# Patient Record
Sex: MALE | Race: WHITE | Employment: OTHER | URBAN - METROPOLITAN AREA
[De-identification: names, ages, dates, MRNs, and addresses within clinical notes are randomized per-mention and may not be internally consistent; named-entity substitution may affect disease eponyms.]

---

## 2018-11-07 ENCOUNTER — HOSPITAL ENCOUNTER (OUTPATIENT)
Age: 78
Discharge: HOME | End: 2018-11-09
Payer: COMMERCIAL

## 2018-11-07 DIAGNOSIS — E78.01 FAMILIAL HYPERCHOLESTEROLEMIA: ICD-10-CM

## 2018-11-07 DIAGNOSIS — R73.9 HYPERGLYCEMIA: ICD-10-CM

## 2018-11-07 DIAGNOSIS — E03.9 PRIMARY HYPOTHYROIDISM: ICD-10-CM

## 2018-11-07 DIAGNOSIS — E55.9 VITAMIN D DEFICIENCY: ICD-10-CM

## 2018-11-07 LAB
ALBUMIN SERPL-MCNC: 4.3 G/DL (ref 3.5–5.2)
ALP BLD-CCNC: 28 U/L (ref 40–129)
ALT SERPL-CCNC: 17 U/L (ref 0–40)
ANION GAP SERPL CALCULATED.3IONS-SCNC: 17 MMOL/L (ref 7–16)
AST SERPL-CCNC: 18 U/L (ref 0–39)
BILIRUB SERPL-MCNC: 0.5 MG/DL (ref 0–1.2)
BUN BLDV-MCNC: 10 MG/DL (ref 8–23)
CALCIUM SERPL-MCNC: 8.7 MG/DL (ref 8.6–10.2)
CHLORIDE BLD-SCNC: 103 MMOL/L (ref 98–107)
CHOLESTEROL, TOTAL: 116 MG/DL (ref 0–199)
CO2: 22 MMOL/L (ref 22–29)
CREAT SERPL-MCNC: 0.9 MG/DL (ref 0.7–1.2)
GFR AFRICAN AMERICAN: >60
GFR NON-AFRICAN AMERICAN: >60 ML/MIN/1.73
GLUCOSE BLD-MCNC: 132 MG/DL (ref 74–99)
HBA1C MFR BLD: 7.1 % (ref 4–5.6)
HDLC SERPL-MCNC: 40 MG/DL
LDL CHOLESTEROL CALCULATED: 47 MG/DL (ref 0–99)
POTASSIUM SERPL-SCNC: 4.6 MMOL/L (ref 3.5–5)
SODIUM BLD-SCNC: 142 MMOL/L (ref 132–146)
TOTAL PROTEIN: 6.8 G/DL (ref 6.4–8.3)
TRIGL SERPL-MCNC: 144 MG/DL (ref 0–149)
TSH SERPL DL<=0.05 MIU/L-ACNC: 4.9 UIU/ML (ref 0.27–4.2)
VITAMIN D 25-HYDROXY: 31 NG/ML (ref 30–100)
VLDLC SERPL CALC-MCNC: 29 MG/DL

## 2018-11-07 PROCEDURE — 80061 LIPID PANEL: CPT

## 2018-11-07 PROCEDURE — 80053 COMPREHEN METABOLIC PANEL: CPT

## 2018-11-07 PROCEDURE — 82306 VITAMIN D 25 HYDROXY: CPT

## 2018-11-07 PROCEDURE — 83036 HEMOGLOBIN GLYCOSYLATED A1C: CPT

## 2018-11-07 PROCEDURE — 84443 ASSAY THYROID STIM HORMONE: CPT

## 2019-02-26 ENCOUNTER — HOSPITAL ENCOUNTER (OUTPATIENT)
Age: 79
Discharge: HOME | End: 2019-02-28
Payer: MEDICARE

## 2019-02-26 DIAGNOSIS — E11.9 TYPE 2 DIABETES MELLITUS WITHOUT COMPLICATION, WITHOUT LONG-TERM CURRENT USE OF INSULIN (HCC): ICD-10-CM

## 2019-02-26 DIAGNOSIS — E03.9 PRIMARY HYPOTHYROIDISM: ICD-10-CM

## 2019-02-26 LAB
ANION GAP SERPL CALCULATED.3IONS-SCNC: 15 MMOL/L (ref 7–16)
BUN BLDV-MCNC: 11 MG/DL (ref 8–23)
CALCIUM SERPL-MCNC: 9.3 MG/DL (ref 8.6–10.2)
CHLORIDE BLD-SCNC: 100 MMOL/L (ref 98–107)
CO2: 25 MMOL/L (ref 22–29)
CREAT SERPL-MCNC: 1 MG/DL (ref 0.7–1.2)
GFR AFRICAN AMERICAN: >60
GFR NON-AFRICAN AMERICAN: >60 ML/MIN/1.73
GLUCOSE BLD-MCNC: 95 MG/DL (ref 74–99)
HBA1C MFR BLD: 7.8 % (ref 4–5.6)
POTASSIUM SERPL-SCNC: 4.6 MMOL/L (ref 3.5–5)
SODIUM BLD-SCNC: 140 MMOL/L (ref 132–146)
T4 FREE: 1.3 NG/DL (ref 0.93–1.7)

## 2019-02-26 PROCEDURE — 80048 BASIC METABOLIC PNL TOTAL CA: CPT

## 2019-02-26 PROCEDURE — 83036 HEMOGLOBIN GLYCOSYLATED A1C: CPT

## 2019-02-26 PROCEDURE — 84439 ASSAY OF FREE THYROXINE: CPT

## 2019-04-06 ENCOUNTER — HOSPITAL ENCOUNTER (OUTPATIENT)
Age: 79
Discharge: HOME | End: 2019-04-08
Payer: MEDICARE

## 2019-04-06 DIAGNOSIS — Z12.5 SPECIAL SCREENING FOR MALIGNANT NEOPLASM OF PROSTATE: ICD-10-CM

## 2019-04-06 DIAGNOSIS — E11.9 TYPE 2 DIABETES MELLITUS WITHOUT COMPLICATION, WITHOUT LONG-TERM CURRENT USE OF INSULIN (HCC): ICD-10-CM

## 2019-04-06 LAB
ANION GAP SERPL CALCULATED.3IONS-SCNC: 14 MMOL/L (ref 7–16)
BUN BLDV-MCNC: 13 MG/DL (ref 8–23)
CALCIUM SERPL-MCNC: 9.2 MG/DL (ref 8.6–10.2)
CHLORIDE BLD-SCNC: 107 MMOL/L (ref 98–107)
CO2: 22 MMOL/L (ref 22–29)
CREAT SERPL-MCNC: 0.9 MG/DL (ref 0.7–1.2)
GFR AFRICAN AMERICAN: >60
GFR NON-AFRICAN AMERICAN: >60 ML/MIN/1.73
GLUCOSE BLD-MCNC: 104 MG/DL (ref 74–99)
POTASSIUM SERPL-SCNC: 4.2 MMOL/L (ref 3.5–5)
PROSTATE SPECIFIC ANTIGEN: 1.87 NG/ML (ref 0–4)
SODIUM BLD-SCNC: 143 MMOL/L (ref 132–146)

## 2019-04-06 PROCEDURE — G0103 PSA SCREENING: HCPCS

## 2019-04-06 PROCEDURE — 36415 COLL VENOUS BLD VENIPUNCTURE: CPT

## 2019-04-06 PROCEDURE — 80048 BASIC METABOLIC PNL TOTAL CA: CPT

## 2019-04-08 ENCOUNTER — HOSPITAL ENCOUNTER (OUTPATIENT)
Dept: DIABETES SERVICES | Age: 79
Setting detail: THERAPIES SERIES
Discharge: HOME | End: 2019-04-08
Payer: MEDICARE

## 2019-04-08 PROCEDURE — G0109 DIAB MANAGE TRN IND/GROUP: HCPCS | Performed by: DIETITIAN, REGISTERED

## 2019-04-08 SDOH — ECONOMIC STABILITY: FOOD INSECURITY: ADDITIONAL INFORMATION: NO

## 2019-04-09 ENCOUNTER — HOSPITAL ENCOUNTER (OUTPATIENT)
Dept: DIABETES SERVICES | Age: 79
Setting detail: THERAPIES SERIES
Discharge: HOME | End: 2019-04-09
Payer: MEDICARE

## 2019-04-09 PROCEDURE — G0109 DIAB MANAGE TRN IND/GROUP: HCPCS | Performed by: DIETITIAN, REGISTERED

## 2019-04-09 NOTE — PROGRESS NOTES
Diabetes Self-Management Education Record    Participant Name: Neto Wright  Referring Provider: Kimi Calle DO  Assessment/Evaluation Ratings:  1=Needs Instruction   4=Demonstrates Understanding/Competency  2=Needs Review   NC=Not Covered    3=Comprehends Key Points  N/A=Not Applicable  Topics/Learning Objectives Pre-session Assess Date:  4/8/19 PC Instr. Date Reinforce Date Post- session Eval Comments   Diabetes disease process & Treatment process: Define diabetes & pre-diabetes; Identify own type of diabetes; role of the pancreas; signs/symptoms; diagnostic criteria; prevention & treatment options; contributing factors. 1 4/8/19 PC  3 New Onset Type 2 DM       Incorporating nutritional management into lifestyle: Describe effect of type, amount & timing of food on blood glucose; Describe basic meal planning techniques & current nutrition guidelines;Correctly read food labels & demonstrate CHO counting & portion control with personalized meal plan. Identify dining out strategies, & dietary sick day guidelines. 1       Incorporating physical activity into lifestyle:   Verbalize effect of exercise on blood glucose levels; benefits of regular exercise; safety considerations; contraindications; maintenance of activity. 1 4/8/19 PC  3 Aerobics, mowing/yard work walking     As needed 10-20 minutes a session   Using medications safely:  Identify effects of diabetes medicines on blood glucose levels; List diabetes medication taken, action & side effects; appropriate injection sites; proper storage; supplies needed; proper technique; safe needle disposal guidelines. 1 4/8/19 PC  4/9/19 PC  2 No DM meds at this time   Monitoring blood glucose, interpreting and using results:  Identify recommended & personal blood glucose targets; importance of testing; testing supplies; HgbA1C target levels; Factors affecting blood glucose;  Importance of logging blood glucose levels for pattern recognition; ketone testing; safe lancet disposal. 1 4/8/19 PC  3 Testing daily   Prevention, detection & treatment of acute complications:  Identify symptoms of hyper & hypoglycemia, and prevention & treatment strategies. Describe sick day guidelines & indications for ketone testing & physician notification. Identify short term consequences of poor control. 1 4/8/19 PC  3 A1C 7.8%       Prevention, detection & treatment of chronic complications:  Define the natural course of diabetes & describe the relationship of blood glucose levels to long term complications of diabetes. Identify preventative measures & standards of care. 1 4/8/19 PC  3 Heart disease    High lipids   Developing strategies to address psychosocial issues:  Describe feelings about living with diabetes; Describe how stress, depression & anxiety affect blood glucose; Identify coping strategies; Identify support needed & support network available. 1 4/8/19 PC  4/9/19 PC  3 PHQ-9 Depression Screen Score: 0     Developing strategies to promote health/change behavior: Identify 7 self-care behaviors; Personal health risk factors; Benefits, challenges & strategies for behavioral change; Individualized goal selection.  1    Goal:     Identified Barriers to learning/adherence to self management plan:    None    Instruction Method:  Lecture/Discussion, Power Point Presentation  and Handouts    Education Materials/Equipment Provided: Self-management manual, Meal Plan and Nutritional Packet       Encounter Type Date Start Time End Time Comments No Show Dates   Assessment 4/8/19  0900 0930   In person    Session 1 4/8/19  0930 1200      Session 2 4/9/19  0900 0930     Session 3         Individual MNT         Gestational MNT         Shared Med Appt         Session 4        Meter Instrx        Insulin Instrx            Additional Comments:    DSMS Support Plan:  Follow-up plan/Date: 7/2019  Contact Post Class Regarding:   Fasting Blood Sugar   HgbA1C   Weight   Hypertension/Follow-up with Physician   Self-Foot Exam Frequency   Monitoring Frequency   Exercise Routine   Goal Attainment  Post Education Referrals:      ? WIC   ? PAP   ? Wound Care   ? Social Service   ?  Home Medical    ?Physician Specialist   ?[address]   ? Sleep Lab   ? Other

## 2019-04-09 NOTE — PROGRESS NOTES
4/8/19 PC 0930 1200      Session 2 4/9/19 PC  4/9/19 REM 0900  0930 0930  1130     Session 3         Individual MNT         Gestational MNT         Shared Med Appt         Session 4        Meter Instrx        Insulin Instrx            Additional Comments:    DSMS Support Plan:  Follow-up plan/Date: 7/2019  Contact Post Class Regarding:   Fasting Blood Sugar   HgbA1C   Weight   Hypertension/Follow-up with Physician   Self-Foot Exam Frequency   Monitoring Frequency   Exercise Routine   Goal Attainment  Post Education Referrals:      ? WIC   ? PAP   ? Wound Care   ? Social Service   ?  Home Medical    ?Physician Specialist   ?[address]   ? Sleep Lab   ? Other

## 2019-04-09 NOTE — LETTER
800  - Diabetes Education    2019       Re:     Massiel Ariza         :  1940  Dear Dr. Alma Duncan:                    Thank you for referring your patient, Massiel Ariza, for diabetes education sessions.     Your patient attended classes the week of 19, that addressed the following topics:    Nursing/Medical [x]     Nutrition [x]  · Describing the diabetes disease process/ treatment options  · Incorporating physical activity into lifestyle  · Using medication safely & for maximum therapeutic effectiveness  · Monitoring blood glucose & other parameters:  Interpreting and using results for self-management & decision making  · Preventing, detecting, & treating acute complications  · Preventing, detecting & treating chronic complications  · Developing personal strategies to address psychosocial issues and concerns  · Developing personal strategies to promote health & behavior change (risk reduction) · Incorporating nutrition management into lifestyle    · Nutrition for diabetes prevention & diabetes  · Relationship among nutrition, exercise, medication & blood glucose levels  · Food Groups/carbohydrate & non- carbohydrate foods  · Whole grain/high fiber foods & needs  · Fluid needs   · Label reading  · Carbohydrate consistent meal planning/ techniques  · Weighing/measuring portions  · Heart healthy guidelines: fat, sodium & cholesterol  · Alcohol  · Free foods/nutritive and non-nutritive sweeteners  · Dining out tips and recipe modification guidelines  · Sick day guidelines     [x]  Instructed on carbohydrate consistent meal plan with  2000 calories, and the following number of carbohydrate servings per meal or snack (15 gm/serving):   Breakfast:  4,  Lunch: 5, Dinner:  4     AM Snack:  0,  PM Snack:  0,  HS Snack:  2    Upon completion of these sessions the diabetes team made the following evaluation of your patients progress:[] Attended class alone [x] Attended classes accompanied by                family/friend/significant other  [x] Very attentive to teaching  [x] Actively participated in class                      discussions   [x] Answered questions appropriately            when asked   [x] Seems able to apply class concepts to     daily lifestyle  [] Had difficulty relating class information      to daily lifestyle  [x] Seems motivated to do well [x] Participated in [place] nutrition                  session  [x] Able to identify proper food choices      and diet changes  [] Unable to identify proper food choices  [x] Verbalizes an understanding of meal       plan  [x] Expresses an intent to comply with                 meal plan  [] Refused to participate in  [place]                nutrition session  [x] Worked out meal timing adjustment                 according to work/schedule/lifestyle     PHQ-9 Depression Screen Score:  0  0-4: Minimal Depression              5-9: Mild Depression    10-14: Moderate Depression  15-19: Moderately Severe Depression  20-27: Severe Depression     COMMENTS:      PATIENT SELECTED GOAL: Check feet more often, get eyes examined. DIABETES SELF-MANAGEMENT SUPPORT PLAN/REFERRALS (patient identified):  [] Contact prescription assistance program  [] Attend smoking cessation classes  [] Diabetes Support Group  [] Attend a follow-up support session  [] Diabetes [place] Subscription   [] Follow up with doctor regarding depression  [] Join an exercise program  [x] Talk with family and friends for support  [] See a financial counselor   [x] Call Diabetes Educator with questions  [] Podiatrist appointment   [x] Ophthalmologist  [] Dental appointment   [] Use Helpful Program Resource             Guide  [] Websites:                                                             www.[employer]ISBX                   www.diabetes. org                    www.eatright. org                   www.[employer]. [place] There will be a follow-up in 3 months to evaluate A1C, carbohydrate recall, attainment of their chosen goal, and self identified support plan. Thank you for referring this patient to our program.  Please do not hesitate to call if you have any questions at [phone] [facility] or [facility]) or [phone] ([address] [place] [address]).         Sincerely,    Diabetes Educators:     [x]  Nadine Mccann RN CDE      []  Joon Jones RN BSN CDE     []  Beverley Everett RDN LD CDE          []  Oskar Schmid RDN LD CDE  [x]  DARRYL [place]brook   []  Dex Vila, MS RDN  LD           Diabetes

## 2019-04-10 ENCOUNTER — HOSPITAL ENCOUNTER (OUTPATIENT)
Dept: DIABETES SERVICES | Age: 79
Setting detail: THERAPIES SERIES
Discharge: HOME | End: 2019-04-10
Payer: MEDICARE

## 2019-04-10 PROCEDURE — 97804 MEDICAL NUTRITION GROUP: CPT

## 2019-04-10 NOTE — PROGRESS NOTES
exercise; safety considerations; contraindications; maintenance of activity. 1 4/8/19 PC  3 Aerobics, mowing/yard work walking     As needed 10-20 minutes a session   Using medications safely:  Identify effects of diabetes medicines on blood glucose levels; List diabetes medication taken, action & side effects; appropriate injection sites; proper storage; supplies needed; proper technique; safe needle disposal guidelines. 1 4/8/19 PC  4/9/19 PC  2 No DM meds at this time   Monitoring blood glucose, interpreting and using results:  Identify recommended & personal blood glucose targets; importance of testing; testing supplies; HgbA1C target levels; Factors affecting blood glucose; Importance of logging blood glucose levels for pattern recognition; ketone testing; safe lancet disposal. 1 4/8/19 PC  3 Testing daily   Prevention, detection & treatment of acute complications:  Identify symptoms of hyper & hypoglycemia, and prevention & treatment strategies. Describe sick day guidelines & indications for ketone testing & physician notification. Identify short term consequences of poor control. 1 4/8/19 PC  3 A1C 7.8%       Prevention, detection & treatment of chronic complications:  Define the natural course of diabetes & describe the relationship of blood glucose levels to long term complications of diabetes. Identify preventative measures & standards of care. 1 4/8/19 PC  3 Heart disease    High lipids   Developing strategies to address psychosocial issues:  Describe feelings about living with diabetes; Describe how stress, depression & anxiety affect blood glucose; Identify coping strategies; Identify support needed & support network available. 1 4/8/19 PC  4/9/19 PC  3 PHQ-9 Depression Screen Score: 0     Developing strategies to promote health/change behavior: Identify 7 self-care behaviors; Personal health risk factors; Benefits, challenges & strategies for behavioral change; Individualized goal selection.  1    Goal: Check feet more often, get eyes examined. Identified Barriers to learning/adherence to self management plan:    None    Instruction Method:  Lecture/Discussion, Power Point Presentation  and Handouts    Education Materials/Equipment Provided: Self-management manual, Meal Plan and Nutritional Packet       Encounter Type Date Start Time End Time Comments No Show Dates   Assessment 4/8/19 PC 0900 0930   In person    Session 1 4/8/19 PC 0930 1200      Session 2 4/9/19 PC  4/9/19 REM 0900  0930 0930  1130     Session 3 4/10/19 REM 0900 1130      Individual MNT         Gestational MNT         Shared Med Appt         Session 4        Meter Instrx        Insulin Instrx            Additional Comments:    DSMS Support Plan:  Follow-up plan/Date: 7/2019  Contact Post Class Regarding:   Fasting Blood Sugar   HgbA1C   Weight   Hypertension/Follow-up with Physician   Self-Foot Exam Frequency   Monitoring Frequency   Exercise Routine   Goal Attainment  Post Education Referrals:      ? WIC   ? PAP   ? Wound Care   ? Social Service   ?  Home Medical    ?Physician Specialist   ?[address]   ? Sleep Lab   ? Other

## 2019-05-28 ENCOUNTER — HOSPITAL ENCOUNTER (OUTPATIENT)
Age: 79
Discharge: HOME | End: 2019-05-30
Payer: MEDICARE

## 2019-05-28 DIAGNOSIS — E11.9 TYPE 2 DIABETES MELLITUS WITHOUT COMPLICATION, WITHOUT LONG-TERM CURRENT USE OF INSULIN (HCC): ICD-10-CM

## 2019-05-28 LAB
ANION GAP SERPL CALCULATED.3IONS-SCNC: 15 MMOL/L (ref 7–16)
BUN BLDV-MCNC: 16 MG/DL (ref 8–23)
CALCIUM SERPL-MCNC: 9.6 MG/DL (ref 8.6–10.2)
CHLORIDE BLD-SCNC: 104 MMOL/L (ref 98–107)
CO2: 23 MMOL/L (ref 22–29)
CREAT SERPL-MCNC: 0.9 MG/DL (ref 0.7–1.2)
GFR AFRICAN AMERICAN: >60
GFR NON-AFRICAN AMERICAN: >60 ML/MIN/1.73
GLUCOSE BLD-MCNC: 93 MG/DL (ref 74–99)
HBA1C MFR BLD: 6 % (ref 4–5.6)
POTASSIUM SERPL-SCNC: 5.4 MMOL/L (ref 3.5–5)
SODIUM BLD-SCNC: 142 MMOL/L (ref 132–146)

## 2019-05-28 PROCEDURE — 83036 HEMOGLOBIN GLYCOSYLATED A1C: CPT

## 2019-05-28 PROCEDURE — 36415 COLL VENOUS BLD VENIPUNCTURE: CPT

## 2019-05-28 PROCEDURE — 80048 BASIC METABOLIC PNL TOTAL CA: CPT

## 2019-05-29 ENCOUNTER — TELEPHONE (OUTPATIENT)
Dept: PHARMACY | Facility: CLINIC | Age: 79
End: 2019-05-29

## 2019-05-29 NOTE — TELEPHONE ENCOUNTER
CLINICAL PHARMACY: ADHERENCE REVIEW  Identified care gap per [facility]*: metformin 500 mg ER medication adherence - has had adherence rate < 80% for last year per report    Diabetes medication(s):   2018 PDC:  None  2019 YTD PDC: 75%  - metformin 500 mg ER  · Refill history: Per records, appears 30 day fill last picked up 3/26/19    No patient out reach planned at this time. Per PCP note dated 4/9/19, patient never started taking metformin therapy as he wanted to attempt diet modification alone before taking medicine. Patient A1C reduced from 7.1% to 6%. Per physician note, patient to not continue metformin. CLINICAL PHARMACY CONSULT: MED RECONCILIATION/REVIEW ADDENDUM    For Pharmacy Admin Tracking Only    PHSO: Yes  Total # of Interventions Recommended: 1  - New Order #: 0 New Medication Order Reason(s): Adherence  - Refills Provided #: 0  - Updated Order #: 1 Updated Order Reason(s):  Other  - Maintenance Safety Lab Monitoring #: 1  - New Therapy Lab Monitoring #: 0  Recommended intervention potential cost savings: 1  Total Interventions Accepted: 0  Time Spent (min): Good 80, PharmD  [address]

## 2019-12-09 ENCOUNTER — INITIAL CONSULT (OUTPATIENT)
Dept: NEUROSURGERY | Age: 79
End: 2019-12-09
Payer: MEDICARE

## 2019-12-09 VITALS
WEIGHT: 190 LBS | BODY MASS INDEX: 25.73 KG/M2 | HEIGHT: 72 IN | HEART RATE: 57 BPM | DIASTOLIC BLOOD PRESSURE: 69 MMHG | SYSTOLIC BLOOD PRESSURE: 150 MMHG

## 2019-12-09 DIAGNOSIS — S06.6X9A SUBARACHNOID HEMORRHAGE FOLLOWING INJURY, WITH LOSS OF CONSCIOUSNESS, INITIAL ENCOUNTER (HCC): Primary | ICD-10-CM

## 2019-12-09 PROBLEM — S06.6XAA TRAUMATIC SUBARACHNOID HEMORRHAGE (HCC): Status: ACTIVE | Noted: 2019-11-20

## 2019-12-09 PROCEDURE — 99203 OFFICE O/P NEW LOW 30 MIN: CPT | Performed by: PHYSICIAN ASSISTANT

## 2019-12-09 ASSESSMENT — ENCOUNTER SYMPTOMS
ABDOMINAL PAIN: 0
TROUBLE SWALLOWING: 0
SHORTNESS OF BREATH: 0
PHOTOPHOBIA: 0
BACK PAIN: 0

## 2020-01-10 ENCOUNTER — HOSPITAL ENCOUNTER (OUTPATIENT)
Dept: CT IMAGING | Age: 80
Discharge: HOME | End: 2020-01-10
Payer: MEDICARE

## 2020-01-10 ENCOUNTER — TELEPHONE (OUTPATIENT)
Dept: NEUROSURGERY | Age: 80
End: 2020-01-10

## 2020-01-10 PROCEDURE — 70450 CT HEAD/BRAIN W/O DYE: CPT

## 2020-01-13 ENCOUNTER — OFFICE VISIT (OUTPATIENT)
Dept: NEUROSURGERY | Age: 80
End: 2020-01-13
Payer: MEDICARE

## 2020-01-13 PROCEDURE — 99213 OFFICE O/P EST LOW 20 MIN: CPT | Performed by: PHYSICIAN ASSISTANT

## 2020-02-07 ENCOUNTER — TELEPHONE (OUTPATIENT)
Dept: NEUROSURGERY | Age: 80
End: 2020-02-07

## 2020-02-07 ENCOUNTER — HOSPITAL ENCOUNTER (INPATIENT)
Age: 80
LOS: 4 days | Discharge: HOME | DRG: 027 | End: 2020-02-11
Attending: EMERGENCY MEDICINE | Admitting: INTERNAL MEDICINE
Payer: MEDICARE

## 2020-02-07 ENCOUNTER — HOSPITAL ENCOUNTER (OUTPATIENT)
Dept: CT IMAGING | Age: 80
Discharge: HOME | End: 2020-02-07
Payer: MEDICARE

## 2020-02-07 PROBLEM — I60.9 SAH (SUBARACHNOID HEMORRHAGE) (HCC): Status: ACTIVE | Noted: 2020-02-07

## 2020-02-07 PROBLEM — S06.5XAA SDH (SUBDURAL HEMATOMA): Status: ACTIVE | Noted: 2020-02-07

## 2020-02-07 LAB
ABO/RH: NORMAL
ALBUMIN SERPL-MCNC: 4 G/DL (ref 3.5–5.2)
ALP BLD-CCNC: 28 U/L (ref 40–129)
ALT SERPL-CCNC: 13 U/L (ref 0–40)
ANGLE (CLOT STRENGTH): 70 DEGREE (ref 59–74)
ANION GAP SERPL CALCULATED.3IONS-SCNC: 10 MMOL/L (ref 7–16)
ANTIBODY SCREEN: NORMAL
APTT: 21.4 SEC (ref 24.5–35.1)
AST SERPL-CCNC: 17 U/L (ref 0–39)
BASOPHILS ABSOLUTE: 0.06 E9/L (ref 0–0.2)
BASOPHILS RELATIVE PERCENT: 0.7 % (ref 0–2)
BILIRUB SERPL-MCNC: 0.3 MG/DL (ref 0–1.2)
BUN BLDV-MCNC: 10 MG/DL (ref 8–23)
CALCIUM SERPL-MCNC: 9.4 MG/DL (ref 8.6–10.2)
CHLORIDE BLD-SCNC: 104 MMOL/L (ref 98–107)
CO2: 27 MMOL/L (ref 22–29)
CREAT SERPL-MCNC: 0.9 MG/DL (ref 0.7–1.2)
EOSINOPHILS ABSOLUTE: 0.41 E9/L (ref 0.05–0.5)
EOSINOPHILS RELATIVE PERCENT: 4.8 % (ref 0–6)
EPL-TEG: 0.1 % (ref 0–15)
G-TEG: 9.4 K D/SC (ref 4.5–11)
GFR AFRICAN AMERICAN: >60
GFR NON-AFRICAN AMERICAN: >60 ML/MIN/1.73
GLUCOSE BLD-MCNC: 101 MG/DL (ref 74–99)
HCT VFR BLD CALC: 40.1 % (ref 37–54)
HEMOGLOBIN: 13.3 G/DL (ref 12.5–16.5)
IMMATURE GRANULOCYTES #: 0.02 E9/L
IMMATURE GRANULOCYTES %: 0.2 % (ref 0–5)
INR BLD: 1
K (CLOTTING TIME): 1.3 MIN (ref 1–3)
LY30 (FIBRINOLYSIS): 0.1 % (ref 0–8)
LYMPHOCYTES ABSOLUTE: 2.62 E9/L (ref 1.5–4)
LYMPHOCYTES RELATIVE PERCENT: 30.7 % (ref 20–42)
MA (MAX AMPLITUDE): 65.3 MM (ref 50–70)
MCH RBC QN AUTO: 30.7 PG (ref 26–35)
MCHC RBC AUTO-ENTMCNC: 33.2 % (ref 32–34.5)
MCV RBC AUTO: 92.6 FL (ref 80–99.9)
MONOCYTES ABSOLUTE: 0.98 E9/L (ref 0.1–0.95)
MONOCYTES RELATIVE PERCENT: 11.5 % (ref 2–12)
NEUTROPHILS ABSOLUTE: 4.44 E9/L (ref 1.8–7.3)
NEUTROPHILS RELATIVE PERCENT: 52.1 % (ref 43–80)
PDW BLD-RTO: 12.7 FL (ref 11.5–15)
PLATELET # BLD: 145 E9/L (ref 130–450)
PMV BLD AUTO: 11.4 FL (ref 7–12)
POTASSIUM SERPL-SCNC: 4.1 MMOL/L (ref 3.5–5)
PROTHROMBIN TIME: 11.4 SEC (ref 9.3–12.4)
R (REACTION TIME): 6.6 MIN (ref 5–10)
RBC # BLD: 4.33 E12/L (ref 3.8–5.8)
SODIUM BLD-SCNC: 141 MMOL/L (ref 132–146)
TOTAL PROTEIN: 6.4 G/DL (ref 6.4–8.3)
WBC # BLD: 8.5 E9/L (ref 4.5–11.5)

## 2020-02-07 PROCEDURE — 85610 PROTHROMBIN TIME: CPT

## 2020-02-07 PROCEDURE — 86901 BLOOD TYPING SEROLOGIC RH(D): CPT

## 2020-02-07 PROCEDURE — 36415 COLL VENOUS BLD VENIPUNCTURE: CPT

## 2020-02-07 PROCEDURE — 86850 RBC ANTIBODY SCREEN: CPT

## 2020-02-07 PROCEDURE — 99285 EMERGENCY DEPT VISIT HI MDM: CPT

## 2020-02-07 PROCEDURE — 86900 BLOOD TYPING SEROLOGIC ABO: CPT

## 2020-02-07 PROCEDURE — 85384 FIBRINOGEN ACTIVITY: CPT

## 2020-02-07 PROCEDURE — 85025 COMPLETE CBC W/AUTO DIFF WBC: CPT

## 2020-02-07 PROCEDURE — 85576 BLOOD PLATELET AGGREGATION: CPT

## 2020-02-07 PROCEDURE — 6360000002 HC RX W HCPCS: Performed by: EMERGENCY MEDICINE

## 2020-02-07 PROCEDURE — 2580000003 HC RX 258: Performed by: INTERNAL MEDICINE

## 2020-02-07 PROCEDURE — 96365 THER/PROPH/DIAG IV INF INIT: CPT

## 2020-02-07 PROCEDURE — 2060000000 HC ICU INTERMEDIATE R&B

## 2020-02-07 PROCEDURE — 85730 THROMBOPLASTIN TIME PARTIAL: CPT

## 2020-02-07 PROCEDURE — 70450 CT HEAD/BRAIN W/O DYE: CPT

## 2020-02-07 PROCEDURE — 94761 N-INVAS EAR/PLS OXIMETRY MLT: CPT

## 2020-02-07 PROCEDURE — 85347 COAGULATION TIME ACTIVATED: CPT

## 2020-02-07 PROCEDURE — 80053 COMPREHEN METABOLIC PANEL: CPT

## 2020-02-07 RX ORDER — ACETAMINOPHEN 325 MG/1
650 TABLET ORAL EVERY 4 HOURS PRN
Status: DISCONTINUED | OUTPATIENT
Start: 2020-02-07 | End: 2020-02-11 | Stop reason: HOSPADM

## 2020-02-07 RX ORDER — SODIUM CHLORIDE 0.9 % (FLUSH) 0.9 %
10 SYRINGE (ML) INJECTION EVERY 12 HOURS SCHEDULED
Status: DISCONTINUED | OUTPATIENT
Start: 2020-02-07 | End: 2020-02-09 | Stop reason: SDUPTHER

## 2020-02-07 RX ORDER — ROSUVASTATIN CALCIUM 10 MG/1
20 TABLET, COATED ORAL DAILY
Status: DISCONTINUED | OUTPATIENT
Start: 2020-02-08 | End: 2020-02-11 | Stop reason: HOSPADM

## 2020-02-07 RX ORDER — SODIUM CHLORIDE 0.9 % (FLUSH) 0.9 %
10 SYRINGE (ML) INJECTION PRN
Status: DISCONTINUED | OUTPATIENT
Start: 2020-02-07 | End: 2020-02-09 | Stop reason: SDUPTHER

## 2020-02-07 RX ORDER — SODIUM CHLORIDE 9 MG/ML
INJECTION, SOLUTION INTRAVENOUS CONTINUOUS
Status: DISCONTINUED | OUTPATIENT
Start: 2020-02-07 | End: 2020-02-08

## 2020-02-07 RX ORDER — ASCORBIC ACID 500 MG
500 TABLET ORAL DAILY
Status: DISCONTINUED | OUTPATIENT
Start: 2020-02-08 | End: 2020-02-11 | Stop reason: HOSPADM

## 2020-02-07 RX ORDER — ONDANSETRON 2 MG/ML
4 INJECTION INTRAMUSCULAR; INTRAVENOUS EVERY 6 HOURS PRN
Status: DISCONTINUED | OUTPATIENT
Start: 2020-02-07 | End: 2020-02-09 | Stop reason: SDUPTHER

## 2020-02-07 RX ORDER — LEVETIRACETAM 5 MG/ML
500 INJECTION INTRAVASCULAR ONCE
Status: COMPLETED | OUTPATIENT
Start: 2020-02-07 | End: 2020-02-07

## 2020-02-07 RX ORDER — LEVETIRACETAM 100 MG/ML
500 SOLUTION ORAL 2 TIMES DAILY
Status: DISCONTINUED | OUTPATIENT
Start: 2020-02-08 | End: 2020-02-10

## 2020-02-07 RX ORDER — METOPROLOL SUCCINATE 25 MG/1
12.5 TABLET, EXTENDED RELEASE ORAL DAILY
Status: DISCONTINUED | OUTPATIENT
Start: 2020-02-08 | End: 2020-02-11 | Stop reason: HOSPADM

## 2020-02-07 RX ADMIN — LEVETIRACETAM 500 MG: 5 INJECTION INTRAVENOUS at 21:17

## 2020-02-07 RX ADMIN — SODIUM CHLORIDE: 9 INJECTION, SOLUTION INTRAVENOUS at 23:37

## 2020-02-07 RX ADMIN — Medication 10 ML: at 23:38

## 2020-02-07 NOTE — TELEPHONE ENCOUNTER
Head CT scan approved. Pt is going to have it done today. Once completed I will take a look at the images and give patient a call with with results.

## 2020-02-08 ENCOUNTER — APPOINTMENT (OUTPATIENT)
Dept: GENERAL RADIOLOGY | Age: 80
DRG: 027 | End: 2020-02-08
Payer: MEDICARE

## 2020-02-08 ENCOUNTER — ANESTHESIA EVENT (OUTPATIENT)
Dept: OPERATING ROOM | Age: 80
DRG: 027 | End: 2020-02-08
Payer: MEDICARE

## 2020-02-08 PROBLEM — I25.10 CORONARY ARTERY DISEASE: Status: ACTIVE | Noted: 2020-02-08

## 2020-02-08 PROBLEM — R56.1 SEIZURE AFTER HEAD INJURY (HCC): Status: ACTIVE | Noted: 2020-02-08

## 2020-02-08 LAB
ANION GAP SERPL CALCULATED.3IONS-SCNC: 17 MMOL/L (ref 7–16)
BUN BLDV-MCNC: 10 MG/DL (ref 8–23)
CALCIUM SERPL-MCNC: 9.1 MG/DL (ref 8.6–10.2)
CHLORIDE BLD-SCNC: 104 MMOL/L (ref 98–107)
CHOLESTEROL, TOTAL: 111 MG/DL (ref 0–199)
CO2: 23 MMOL/L (ref 22–29)
CREAT SERPL-MCNC: 0.8 MG/DL (ref 0.7–1.2)
GFR AFRICAN AMERICAN: >60
GFR NON-AFRICAN AMERICAN: >60 ML/MIN/1.73
GLUCOSE BLD-MCNC: 103 MG/DL (ref 74–99)
HBA1C MFR BLD: 6.2 % (ref 4–5.6)
HCT VFR BLD CALC: 44.3 % (ref 37–54)
HDLC SERPL-MCNC: 40 MG/DL
HEMOGLOBIN: 14.6 G/DL (ref 12.5–16.5)
LDL CHOLESTEROL CALCULATED: 37 MG/DL (ref 0–99)
MCH RBC QN AUTO: 30.4 PG (ref 26–35)
MCHC RBC AUTO-ENTMCNC: 33 % (ref 32–34.5)
MCV RBC AUTO: 92.3 FL (ref 80–99.9)
PDW BLD-RTO: 12.8 FL (ref 11.5–15)
PLATELET # BLD: 174 E9/L (ref 130–450)
PMV BLD AUTO: 11.4 FL (ref 7–12)
POTASSIUM SERPL-SCNC: 3.9 MMOL/L (ref 3.5–5)
RBC # BLD: 4.8 E12/L (ref 3.8–5.8)
SODIUM BLD-SCNC: 144 MMOL/L (ref 132–146)
TRIGL SERPL-MCNC: 170 MG/DL (ref 0–149)
VLDLC SERPL CALC-MCNC: 34 MG/DL
WBC # BLD: 7.6 E9/L (ref 4.5–11.5)

## 2020-02-08 PROCEDURE — 36415 COLL VENOUS BLD VENIPUNCTURE: CPT

## 2020-02-08 PROCEDURE — 85027 COMPLETE CBC AUTOMATED: CPT

## 2020-02-08 PROCEDURE — 99222 1ST HOSP IP/OBS MODERATE 55: CPT | Performed by: NEUROLOGICAL SURGERY

## 2020-02-08 PROCEDURE — 6370000000 HC RX 637 (ALT 250 FOR IP): Performed by: INTERNAL MEDICINE

## 2020-02-08 PROCEDURE — 80061 LIPID PANEL: CPT

## 2020-02-08 PROCEDURE — 97161 PT EVAL LOW COMPLEX 20 MIN: CPT

## 2020-02-08 PROCEDURE — 2580000003 HC RX 258: Performed by: NEUROLOGICAL SURGERY

## 2020-02-08 PROCEDURE — 71045 X-RAY EXAM CHEST 1 VIEW: CPT

## 2020-02-08 PROCEDURE — 80048 BASIC METABOLIC PNL TOTAL CA: CPT

## 2020-02-08 PROCEDURE — 92523 SPEECH SOUND LANG COMPREHEN: CPT | Performed by: SPEECH-LANGUAGE PATHOLOGIST

## 2020-02-08 PROCEDURE — 83036 HEMOGLOBIN GLYCOSYLATED A1C: CPT

## 2020-02-08 PROCEDURE — 2060000000 HC ICU INTERMEDIATE R&B

## 2020-02-08 RX ORDER — CEFAZOLIN SODIUM 2 G/50ML
2 SOLUTION INTRAVENOUS
Status: COMPLETED | OUTPATIENT
Start: 2020-02-09 | End: 2020-02-09

## 2020-02-08 RX ORDER — SODIUM CHLORIDE 0.9 % (FLUSH) 0.9 %
10 SYRINGE (ML) INJECTION EVERY 12 HOURS SCHEDULED
Status: DISCONTINUED | OUTPATIENT
Start: 2020-02-08 | End: 2020-02-09

## 2020-02-08 RX ORDER — SODIUM CHLORIDE 0.9 % (FLUSH) 0.9 %
10 SYRINGE (ML) INJECTION PRN
Status: DISCONTINUED | OUTPATIENT
Start: 2020-02-08 | End: 2020-02-09

## 2020-02-08 RX ADMIN — METOPROLOL SUCCINATE 12.5 MG: 25 TABLET, EXTENDED RELEASE ORAL at 10:13

## 2020-02-08 RX ADMIN — Medication 500 MG: at 10:12

## 2020-02-08 RX ADMIN — LEVETIRACETAM 500 MG: 500 SOLUTION ORAL at 10:12

## 2020-02-08 RX ADMIN — ROSUVASTATIN CALCIUM 20 MG: 10 TABLET, FILM COATED ORAL at 10:12

## 2020-02-08 RX ADMIN — LEVETIRACETAM 500 MG: 500 SOLUTION ORAL at 21:18

## 2020-02-08 RX ADMIN — SODIUM CHLORIDE, PRESERVATIVE FREE 10 ML: 5 INJECTION INTRAVENOUS at 21:19

## 2020-02-08 ASSESSMENT — PAIN SCALES - GENERAL
PAINLEVEL_OUTOF10: 0

## 2020-02-08 NOTE — ANESTHESIA PRE PROCEDURE
Impression   Normal sinus rhythm- rate 60  Normal ECG  No previous ECGs available     ECHO 1/11/2017  EF- 63    CT Head WO Contrast 2/7/2020  Impression   Interval enlargement of a mixed attenuation extra axial collection   overlying the right frontoparietal convexity with thin hyperdensity of   the wall, possibly related to calcification. There is regional mass   effect with approximately 4 mm of right-to-left midline shift.       These findings were communicated to radiology department personnel by   abnormal results form for further communication to the requesting   physician at the conclusion of this examination, per hospital   protocol. Anesthesia Evaluation  Patient summary reviewed and Nursing notes reviewed no history of anesthetic complications:   Airway: Mallampati: II  TM distance: >3 FB   Neck ROM: full  Mouth opening: > = 3 FB Dental:          Pulmonary:   (+) sleep apnea: on noncompliant,  decreased breath sounds,                            ROS comment: Former smoker   Cardiovascular:    (+) hypertension: no interval change, past MI:, CAD:, CABG/stent (CABG 20+ years, Stents 2 years ago): no interval change, hyperlipidemia        Rhythm: regular  Rate: normal           Beta Blocker:  Dose within 24 Hrs      ROS comment: Aortic aneurysm      Neuro/Psych:   (+) seizures:,              ROS comment: subdural hematoma  Traumatic subarachnoid hemorrhage  GI/Hepatic/Renal: Neg GI/Hepatic/Renal ROS            Endo/Other:    (+) DiabetesType II DM, no interval change, , .                  ROS comment: Leukocytosis Abdominal:           Vascular:   + PVD, aortic or cerebral, PE. Anesthesia Plan      general     ASA 4 - emergent     (R FA 20)  Induction: intravenous. arterial line and BIS  MIPS: Postoperative opioids intended, Prophylactic antiemetics administered and Postoperative trial extubation. Anesthetic plan and risks discussed with patient.     Use of blood

## 2020-02-08 NOTE — ED PROVIDER NOTES
RDW 12.7 11.5 - 15.0 fL    Platelets [phone] - 037 E9/L    MPV 11.4 7.0 - 12.0 fL    Neutrophils % 52.1 43.0 - 80.0 %    Immature Granulocytes % 0.2 0.0 - 5.0 %    Lymphocytes % 30.7 20.0 - 42.0 %    Monocytes % 11.5 2.0 - 12.0 %    Eosinophils % 4.8 0.0 - 6.0 %    Basophils % 0.7 0.0 - 2.0 %    Neutrophils Absolute 4.44 1.80 - 7.30 E9/L    Immature Granulocytes # 0.02 E9/L    Lymphocytes Absolute 2.62 1.50 - 4.00 E9/L    Monocytes Absolute 0.98 (H) 0.10 - 0.95 E9/L    Eosinophils Absolute 0.41 0.05 - 0.50 E9/L    Basophils Absolute 0.06 0.00 - 0.20 E9/L   Protime-INR   Result Value Ref Range    Protime 11.4 9.3 - 12.4 sec    INR 1.0    APTT   Result Value Ref Range    aPTT 21.4 (L) 24.5 - 35.1 sec   ,       RADIOLOGY:  Interpreted by Radiologist unless otherwise specified  No orders to display                    ------------------------- NURSING NOTES AND VITALS REVIEWED ---------------------------   The nursing notes within the ED encounter and vital signs as below have been reviewed by myself  /69   Pulse 61   Resp 17   Ht 6' (1.829 m)   Wt 184 lb (83.5 kg)   SpO2 97%   BMI 24.95 kg/m²     Oxygen Saturation Interpretation: Normal    The cardiac monitor revealed NSR with a heart rate in the 60s as interpreted by me. The cardiac monitor was ordered secondary to the patient's heart rate and to monitor the patient for dysrhythmia. CPT 75785    The patients available past medical records and past encounters were reviewed. ------------------------------ ED COURSE/MEDICAL DECISION MAKING----------------------  Medications   levetiracetam (KEPPRA) 500 mg/100 mL IVPB (500 mg Intravenous New Bag 2/7/20 2117)                    Medical Decision Making:   Currently not experiencing any symptoms, spoke with neurosurgery, they request we reload him on antiepileptic, and shared with the patient is not taking any anticoagulation or any antiplatelet medications.   Neurosurgery would like the patient mended medically with likely intervention. Spoke with medicine patient will be admitted      Re-Evaluations:          Re-evaluation. Patients symptoms show no change  Repeat physical examination is not changed        This patient's ED course included: a personal history and physicial examination, re-evaluation prior to disposition, IV medications, cardiac monitoring and complex medical decision making and emergency management    This patient has initially deteriorated, but then stabilized during their ED course. Consultations:  Spoke with Dr. Douglas Dumont (Neurosurgery). Discussed case. They will provide consultation. Spoke with Dr. Ame Thomas (Medicine). Discussed case. They will admit this patient. Critical Care:         Counseling: The emergency provider has spoken with the patient and family and discussed todays results, in addition to providing specific details for the plan of care and counseling regarding the diagnosis and prognosis. Questions are answered at this time and they are agreeable with the plan.       --------------------------------- IMPRESSION AND DISPOSITION ---------------------------------    IMPRESSION  1. SDH (subdural hematoma) ([facility])        DISPOSITION  Disposition: Admit to telemetry  Patient condition is stable        NOTE: This report was transcribed using voice recognition software.  Every effort was made to ensure accuracy; however, inadvertent computerized transcription errors may be present       Leigh Ann Ferrera DO  02/07/20 0661

## 2020-02-08 NOTE — PROGRESS NOTES
Solving/Reasoning   Verbal Sequencing:   Functional        Verbal Problem solving:   Functional          CLINICAL OBSERVATIONS NOTED DURING THE EVALUATION  Within functional limits                       The admitting diagnosis and active problem list, as listed below have been reviewed prior to initiation of this evaluation.         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

## 2020-02-08 NOTE — H&P
History and Physical      CHIEF COMPLAINT:  SAH      HISTORY OF PRESENT ILLNESS:      The patient is a 67 y.o. male patient of Dr. Cedric Lorenzo  Referred to the ER after OP head CT reports an enlarging SDH/SAH with midline shift. The patient developed right head pain, weakness of the left hand with non intentional movement. The patient sustained a traumatic [address] [place] 2 months ago with OP f/u. CT head:  Impression  Interval enlargement of a mixed attenuation extra axial collection overlying the right frontoparietal convexity with thin hyperdensity of the wall, possibly related to calcification. There is regional mass  effect with approximately 4 mm of right-to-left midline shift        Past Medical History:    Past Medical History:   Diagnosis Date    Aneurysm of aorta ([address]     CAD (coronary artery disease)     HLD (hyperlipidemia)     Hypertension     Neck pain     PAD (peripheral artery disease) (HCC)     Pulmonary emboli (HCC)     TIA (transient ischemic attack)     UTI (lower urinary tract infection)        Past Surgical History:    Past Surgical History:   Procedure Laterality Date    CARDIAC SURGERY      CORONARY ARTERY BYPASS GRAFT      TONSILLECTOMY         Medications Prior to Admission:    Medications Prior to Admission: Cholecalciferol (VITAMIN D3) 125 MCG (5000 UT) TABS, Take by mouth  rosuvastatin (CRESTOR) 20 MG tablet, Take 1 tablet by mouth daily  metoprolol succinate (TOPROL XL) 25 MG extended release tablet, Take 12.5 mg by mouth daily  Ascorbic Acid (VITAMIN C) 500 MG tablet, Take 500 mg by mouth daily. glucose monitoring kit (FREESTYLE) monitoring kit, 1 kit by Does not apply route 2 times daily  blood glucose monitor strips, Test 2 times a day & as needed for symptoms of irregular blood glucose. Lancets MISC, 1 each by Does not apply route 2 times daily    Allergies:    Patient has no known allergies. Social History:    reports that he quit smoking about 50 years ago.  He has a 20.00 LABGLOM >60 02/08/2020    GLUCOSE 103 02/08/2020    GLUCOSE 118 03/19/2012    PROT 6.4 02/07/2020    LABALBU 4.0 02/07/2020    LABALBU 4.5 03/19/2012    CALCIUM 9.1 02/08/2020    BILITOT 0.3 02/07/2020    ALKPHOS 28 02/07/2020    AST 17 02/07/2020    ALT 13 02/07/2020       ASSESSMENT:      Active Hospital Problems    Diagnosis Date Noted    Coronary artery disease involving native heart with angina pectoris ([facility] [I25.119]      Priority: Medium    HLD (hyperlipidemia) [E78.5]      Priority: Medium    Coronary artery disease [I25.10] 02/08/2020    SDH (subdural hematoma) ([facility] [facility] [S06.5X9A] 02/07/2020    SAH (subarachnoid hemorrhage) ([facility] [I60.9] 02/07/2020    Traumatic subarachnoid hemorrhage ([facility] [facility] [S06.6X9A] 11/20/2019    Type 2 diabetes mellitus without complication, without long-term current use of insulin ([facility] [facility] [E11.9] 02/26/2019        PLAN:  Admit to a monitored floor               NS consult               Continue tx for medical issues               Keppra for seizure         Maricruz Edwards DO  10:56 AM  2/8/2020

## 2020-02-08 NOTE — PROGRESS NOTES
Dynamic Standing:  Supervision      Pt is A & O x 3 and gave excellent history   Sensation:  Pt denies numbness and tingling to extremities  Coordination:  Normal bilaterally with finger to nose and heel to shin testing     Vitals:  Not Tested during evaluation   Blood Pressure at rest  Blood Pressure post session    Heart Rate at rest Heart Rate post session    SPO2 at rest  SPO2 post session      Therapeutic Exercises:  NA- eval only     Patient education  Pt educated on role of PT while pt is in the hospital and post-surgery. Patient response to education:   Pt verbalized understanding Pt demonstrated skill Pt requires further education in this area   Yes  NA Yes      ASSESSMENT:    Comments:  Pt tolerated eval well. All questions answered. Treatment:  Patient practiced and was instructed in the following treatment:     NA    Pt's/ family goals   1. To return to being independent     Patient and or family understand(s) diagnosis, prognosis, and plan of care. Yes     PLAN:    PT care will be provided in accordance with the objectives noted above. Exercises and functional mobility practice will be used as well as appropriate assistive devices or modalities to obtain goals. Patient and family education will also be administered as needed. Frequency of treatments: 2-5x/week x 1-2 weeks. Time in  1030  Time out  1056    Total Treatment Time  26 minutes     Evaluation Time includes thorough review of current medical information, gathering information on past medical history/social history and prior level of function, completion of standardized testing/informal observation of tasks, assessment of data and education on plan of care and goals.     CPT codes:  [x] Low Complexity PT evaluation 67789  [] Moderate Complexity PT evaluation 56447  [] High Complexity PT evaluation 25122  [] PT Re-evaluation 38155  [] Gait training 52775  minutes  [] Manual therapy 24613  minutes  [] Therapeutic activities 42509  minutes  [] Therapeutic exercises 38592  minutes  [] Neuromuscular reeducation 56285  minutes     Terri Chaudhry DPT  [id number]

## 2020-02-09 ENCOUNTER — ANESTHESIA (OUTPATIENT)
Dept: OPERATING ROOM | Age: 80
DRG: 027 | End: 2020-02-09
Payer: MEDICARE

## 2020-02-09 VITALS — SYSTOLIC BLOOD PRESSURE: 161 MMHG | TEMPERATURE: 96.1 F | OXYGEN SATURATION: 100 % | DIASTOLIC BLOOD PRESSURE: 64 MMHG

## 2020-02-09 LAB — METER GLUCOSE: 140 MG/DL (ref 74–99)

## 2020-02-09 PROCEDURE — 009430Z DRAINAGE OF INTRACRANIAL SUBDURAL SPACE WITH DRAINAGE DEVICE, PERCUTANEOUS APPROACH: ICD-10-PCS | Performed by: NEUROLOGICAL SURGERY

## 2020-02-09 PROCEDURE — 2580000003 HC RX 258

## 2020-02-09 PROCEDURE — 3600000015 HC SURGERY LEVEL 5 ADDTL 15MIN: Performed by: NEUROLOGICAL SURGERY

## 2020-02-09 PROCEDURE — 61154 BURR HOLE W/EVAC&/DRG HMTMA: CPT | Performed by: NEUROLOGICAL SURGERY

## 2020-02-09 PROCEDURE — 6360000002 HC RX W HCPCS: Performed by: NEUROLOGICAL SURGERY

## 2020-02-09 PROCEDURE — 6370000000 HC RX 637 (ALT 250 FOR IP): Performed by: NEUROLOGICAL SURGERY

## 2020-02-09 PROCEDURE — 6360000002 HC RX W HCPCS: Performed by: NURSE PRACTITIONER

## 2020-02-09 PROCEDURE — 2500000003 HC RX 250 WO HCPCS

## 2020-02-09 PROCEDURE — 2000000000 HC ICU R&B

## 2020-02-09 PROCEDURE — 2709999900 HC NON-CHARGEABLE SUPPLY: Performed by: NEUROLOGICAL SURGERY

## 2020-02-09 PROCEDURE — C1713 ANCHOR/SCREW BN/BN,TIS/BN: HCPCS | Performed by: NEUROLOGICAL SURGERY

## 2020-02-09 PROCEDURE — 6360000002 HC RX W HCPCS

## 2020-02-09 PROCEDURE — 82962 GLUCOSE BLOOD TEST: CPT

## 2020-02-09 PROCEDURE — 7100000001 HC PACU RECOVERY - ADDTL 15 MIN: Performed by: NEUROLOGICAL SURGERY

## 2020-02-09 PROCEDURE — 3700000001 HC ADD 15 MINUTES (ANESTHESIA): Performed by: NEUROLOGICAL SURGERY

## 2020-02-09 PROCEDURE — 2500000003 HC RX 250 WO HCPCS: Performed by: NEUROLOGICAL SURGERY

## 2020-02-09 PROCEDURE — 2580000003 HC RX 258: Performed by: NEUROLOGICAL SURGERY

## 2020-02-09 PROCEDURE — 3700000000 HC ANESTHESIA ATTENDED CARE: Performed by: NEUROLOGICAL SURGERY

## 2020-02-09 PROCEDURE — 7100000000 HC PACU RECOVERY - FIRST 15 MIN: Performed by: NEUROLOGICAL SURGERY

## 2020-02-09 PROCEDURE — 87081 CULTURE SCREEN ONLY: CPT

## 2020-02-09 PROCEDURE — 51702 INSERT TEMP BLADDER CATH: CPT

## 2020-02-09 PROCEDURE — 2500000003 HC RX 250 WO HCPCS: Performed by: NURSE PRACTITIONER

## 2020-02-09 PROCEDURE — 3600000005 HC SURGERY LEVEL 5 BASE: Performed by: NEUROLOGICAL SURGERY

## 2020-02-09 DEVICE — LOW PROFILE BURR HOLE COVER, W/TAB, 14MM
Type: IMPLANTABLE DEVICE | Site: CRANIAL | Status: FUNCTIONAL
Brand: UNIVERSAL NEURO 2

## 2020-02-09 DEVICE — SCREW UN3 SLFTP 1.5X4MM: Type: IMPLANTABLE DEVICE | Site: CRANIAL | Status: FUNCTIONAL

## 2020-02-09 DEVICE — BURR HOLE COVER PLATE WITH TAB, 14MM
Type: IMPLANTABLE DEVICE | Site: CRANIAL | Status: FUNCTIONAL
Brand: UNIVERSAL NEURO 2

## 2020-02-09 RX ORDER — LABETALOL HYDROCHLORIDE 5 MG/ML
10 INJECTION, SOLUTION INTRAVENOUS EVERY 10 MIN PRN
Status: DISCONTINUED | OUTPATIENT
Start: 2020-02-09 | End: 2020-02-10

## 2020-02-09 RX ORDER — SODIUM CHLORIDE 0.9 % (FLUSH) 0.9 %
10 SYRINGE (ML) INJECTION EVERY 12 HOURS SCHEDULED
Status: DISCONTINUED | OUTPATIENT
Start: 2020-02-09 | End: 2020-02-11 | Stop reason: HOSPADM

## 2020-02-09 RX ORDER — CEFAZOLIN SODIUM 2 G/50ML
2 SOLUTION INTRAVENOUS EVERY 8 HOURS
Status: COMPLETED | OUTPATIENT
Start: 2020-02-09 | End: 2020-02-11

## 2020-02-09 RX ORDER — OXYCODONE HYDROCHLORIDE 5 MG/1
5 TABLET ORAL EVERY 4 HOURS PRN
Status: DISCONTINUED | OUTPATIENT
Start: 2020-02-09 | End: 2020-02-11 | Stop reason: HOSPADM

## 2020-02-09 RX ORDER — HYDROCODONE BITARTRATE AND ACETAMINOPHEN 5; 325 MG/1; MG/1
2 TABLET ORAL PRN
Status: DISCONTINUED | OUTPATIENT
Start: 2020-02-09 | End: 2020-02-09 | Stop reason: HOSPADM

## 2020-02-09 RX ORDER — SODIUM CHLORIDE 9 MG/ML
INJECTION, SOLUTION INTRAVENOUS CONTINUOUS PRN
Status: DISCONTINUED | OUTPATIENT
Start: 2020-02-09 | End: 2020-02-09 | Stop reason: SDUPTHER

## 2020-02-09 RX ORDER — NEOSTIGMINE METHYLSULFATE 1 MG/ML
INJECTION, SOLUTION INTRAVENOUS PRN
Status: DISCONTINUED | OUTPATIENT
Start: 2020-02-09 | End: 2020-02-09 | Stop reason: SDUPTHER

## 2020-02-09 RX ORDER — DIAPER,BRIEF,INFANT-TODD,DISP
EACH MISCELLANEOUS PRN
Status: DISCONTINUED | OUTPATIENT
Start: 2020-02-09 | End: 2020-02-09 | Stop reason: HOSPADM

## 2020-02-09 RX ORDER — HYDRALAZINE HYDROCHLORIDE 20 MG/ML
10 INJECTION INTRAMUSCULAR; INTRAVENOUS EVERY 10 MIN PRN
Status: DISCONTINUED | OUTPATIENT
Start: 2020-02-09 | End: 2020-02-10

## 2020-02-09 RX ORDER — MORPHINE SULFATE 2 MG/ML
1 INJECTION, SOLUTION INTRAMUSCULAR; INTRAVENOUS EVERY 5 MIN PRN
Status: DISCONTINUED | OUTPATIENT
Start: 2020-02-09 | End: 2020-02-09 | Stop reason: HOSPADM

## 2020-02-09 RX ORDER — MORPHINE SULFATE 2 MG/ML
2 INJECTION, SOLUTION INTRAMUSCULAR; INTRAVENOUS
Status: DISCONTINUED | OUTPATIENT
Start: 2020-02-09 | End: 2020-02-10

## 2020-02-09 RX ORDER — GLYCOPYRROLATE 1 MG/5 ML
SYRINGE (ML) INTRAVENOUS PRN
Status: DISCONTINUED | OUTPATIENT
Start: 2020-02-09 | End: 2020-02-09 | Stop reason: SDUPTHER

## 2020-02-09 RX ORDER — OXYCODONE HYDROCHLORIDE 5 MG/1
10 TABLET ORAL EVERY 4 HOURS PRN
Status: DISCONTINUED | OUTPATIENT
Start: 2020-02-09 | End: 2020-02-11 | Stop reason: HOSPADM

## 2020-02-09 RX ORDER — MORPHINE SULFATE 4 MG/ML
4 INJECTION, SOLUTION INTRAMUSCULAR; INTRAVENOUS
Status: DISCONTINUED | OUTPATIENT
Start: 2020-02-09 | End: 2020-02-10

## 2020-02-09 RX ORDER — MORPHINE SULFATE 2 MG/ML
2 INJECTION, SOLUTION INTRAMUSCULAR; INTRAVENOUS EVERY 5 MIN PRN
Status: DISCONTINUED | OUTPATIENT
Start: 2020-02-09 | End: 2020-02-09 | Stop reason: HOSPADM

## 2020-02-09 RX ORDER — MEPERIDINE HYDROCHLORIDE 50 MG/ML
12.5 INJECTION INTRAMUSCULAR; INTRAVENOUS; SUBCUTANEOUS EVERY 5 MIN PRN
Status: DISCONTINUED | OUTPATIENT
Start: 2020-02-09 | End: 2020-02-09 | Stop reason: HOSPADM

## 2020-02-09 RX ORDER — EPHEDRINE SULFATE/0.9% NACL/PF 50 MG/5 ML
SYRINGE (ML) INTRAVENOUS PRN
Status: DISCONTINUED | OUTPATIENT
Start: 2020-02-09 | End: 2020-02-09 | Stop reason: SDUPTHER

## 2020-02-09 RX ORDER — ONDANSETRON 2 MG/ML
INJECTION INTRAMUSCULAR; INTRAVENOUS PRN
Status: DISCONTINUED | OUTPATIENT
Start: 2020-02-09 | End: 2020-02-09 | Stop reason: SDUPTHER

## 2020-02-09 RX ORDER — MIDAZOLAM HYDROCHLORIDE 1 MG/ML
INJECTION INTRAMUSCULAR; INTRAVENOUS PRN
Status: DISCONTINUED | OUTPATIENT
Start: 2020-02-09 | End: 2020-02-09 | Stop reason: SDUPTHER

## 2020-02-09 RX ORDER — PROPOFOL 10 MG/ML
INJECTION, EMULSION INTRAVENOUS PRN
Status: DISCONTINUED | OUTPATIENT
Start: 2020-02-09 | End: 2020-02-09 | Stop reason: SDUPTHER

## 2020-02-09 RX ORDER — SODIUM CHLORIDE 0.9 % (FLUSH) 0.9 %
10 SYRINGE (ML) INJECTION PRN
Status: DISCONTINUED | OUTPATIENT
Start: 2020-02-09 | End: 2020-02-11 | Stop reason: HOSPADM

## 2020-02-09 RX ORDER — DEXAMETHASONE SODIUM PHOSPHATE 10 MG/ML
INJECTION INTRAMUSCULAR; INTRAVENOUS PRN
Status: DISCONTINUED | OUTPATIENT
Start: 2020-02-09 | End: 2020-02-09 | Stop reason: SDUPTHER

## 2020-02-09 RX ORDER — ONDANSETRON 2 MG/ML
4 INJECTION INTRAMUSCULAR; INTRAVENOUS EVERY 6 HOURS PRN
Status: DISCONTINUED | OUTPATIENT
Start: 2020-02-09 | End: 2020-02-11 | Stop reason: HOSPADM

## 2020-02-09 RX ORDER — FENTANYL CITRATE 50 UG/ML
INJECTION, SOLUTION INTRAMUSCULAR; INTRAVENOUS PRN
Status: DISCONTINUED | OUTPATIENT
Start: 2020-02-09 | End: 2020-02-09 | Stop reason: SDUPTHER

## 2020-02-09 RX ORDER — ROCURONIUM BROMIDE 10 MG/ML
INJECTION, SOLUTION INTRAVENOUS PRN
Status: DISCONTINUED | OUTPATIENT
Start: 2020-02-09 | End: 2020-02-09 | Stop reason: SDUPTHER

## 2020-02-09 RX ORDER — HYDROCODONE BITARTRATE AND ACETAMINOPHEN 5; 325 MG/1; MG/1
1 TABLET ORAL PRN
Status: DISCONTINUED | OUTPATIENT
Start: 2020-02-09 | End: 2020-02-09 | Stop reason: HOSPADM

## 2020-02-09 RX ORDER — LIDOCAINE HYDROCHLORIDE AND EPINEPHRINE 5; 5 MG/ML; UG/ML
INJECTION, SOLUTION INFILTRATION; PERINEURAL PRN
Status: DISCONTINUED | OUTPATIENT
Start: 2020-02-09 | End: 2020-02-09 | Stop reason: HOSPADM

## 2020-02-09 RX ORDER — PHENYLEPHRINE HYDROCHLORIDE 10 MG/ML
INJECTION INTRAVENOUS PRN
Status: DISCONTINUED | OUTPATIENT
Start: 2020-02-09 | End: 2020-02-09 | Stop reason: SDUPTHER

## 2020-02-09 RX ORDER — LIDOCAINE HYDROCHLORIDE 20 MG/ML
INJECTION, SOLUTION INTRAVENOUS PRN
Status: DISCONTINUED | OUTPATIENT
Start: 2020-02-09 | End: 2020-02-09 | Stop reason: SDUPTHER

## 2020-02-09 RX ORDER — PROMETHAZINE HYDROCHLORIDE 25 MG/ML
6.25 INJECTION, SOLUTION INTRAMUSCULAR; INTRAVENOUS EVERY 10 MIN PRN
Status: DISCONTINUED | OUTPATIENT
Start: 2020-02-09 | End: 2020-02-09 | Stop reason: HOSPADM

## 2020-02-09 RX ADMIN — ROCURONIUM BROMIDE 10 MG: 10 INJECTION, SOLUTION INTRAVENOUS at 07:23

## 2020-02-09 RX ADMIN — SODIUM CHLORIDE: 9 INJECTION, SOLUTION INTRAVENOUS at 06:59

## 2020-02-09 RX ADMIN — LEVETIRACETAM 500 MG: 500 SOLUTION ORAL at 11:15

## 2020-02-09 RX ADMIN — ACETAMINOPHEN 650 MG: 325 TABLET, FILM COATED ORAL at 12:50

## 2020-02-09 RX ADMIN — Medication 5 MG: at 07:26

## 2020-02-09 RX ADMIN — LIDOCAINE HYDROCHLORIDE 80 MG: 20 INJECTION, SOLUTION INTRAVENOUS at 07:05

## 2020-02-09 RX ADMIN — CEFAZOLIN SODIUM 2 G: 2 SOLUTION INTRAVENOUS at 23:20

## 2020-02-09 RX ADMIN — DEXAMETHASONE SODIUM PHOSPHATE 10 MG: 10 INJECTION INTRAMUSCULAR; INTRAVENOUS at 07:18

## 2020-02-09 RX ADMIN — SODIUM CHLORIDE, PRESERVATIVE FREE 10 ML: 5 INJECTION INTRAVENOUS at 20:55

## 2020-02-09 RX ADMIN — Medication 3 MG: at 07:56

## 2020-02-09 RX ADMIN — Medication 0.2 MG: at 07:20

## 2020-02-09 RX ADMIN — SODIUM CHLORIDE, PRESERVATIVE FREE 10 ML: 5 INJECTION INTRAVENOUS at 12:50

## 2020-02-09 RX ADMIN — Medication 500 MG: at 11:19

## 2020-02-09 RX ADMIN — PHENYLEPHRINE HYDROCHLORIDE 50 MCG: 10 INJECTION INTRAVENOUS at 07:50

## 2020-02-09 RX ADMIN — ROSUVASTATIN CALCIUM 20 MG: 10 TABLET, FILM COATED ORAL at 11:18

## 2020-02-09 RX ADMIN — HYDRALAZINE HYDROCHLORIDE 10 MG: 20 INJECTION INTRAMUSCULAR; INTRAVENOUS at 17:04

## 2020-02-09 RX ADMIN — PROPOFOL 200 MG: 10 INJECTION, EMULSION INTRAVENOUS at 07:05

## 2020-02-09 RX ADMIN — LABETALOL HYDROCHLORIDE 10 MG: 5 INJECTION INTRAVENOUS at 16:04

## 2020-02-09 RX ADMIN — PHENYLEPHRINE HYDROCHLORIDE 50 MCG: 10 INJECTION INTRAVENOUS at 07:20

## 2020-02-09 RX ADMIN — LEVETIRACETAM 500 MG: 500 SOLUTION ORAL at 20:54

## 2020-02-09 RX ADMIN — SUGAMMADEX 200 MG: 100 INJECTION, SOLUTION INTRAVENOUS at 08:09

## 2020-02-09 RX ADMIN — CEFAZOLIN SODIUM 2 G: 2 SOLUTION INTRAVENOUS at 07:12

## 2020-02-09 RX ADMIN — Medication 0.6 MG: at 07:56

## 2020-02-09 RX ADMIN — METOPROLOL SUCCINATE 12.5 MG: 25 TABLET, EXTENDED RELEASE ORAL at 11:17

## 2020-02-09 RX ADMIN — ONDANSETRON HYDROCHLORIDE 4 MG: 2 INJECTION, SOLUTION INTRAMUSCULAR; INTRAVENOUS at 07:49

## 2020-02-09 RX ADMIN — FENTANYL CITRATE 100 MCG: 50 INJECTION, SOLUTION INTRAMUSCULAR; INTRAVENOUS at 07:05

## 2020-02-09 RX ADMIN — MIDAZOLAM 2 MG: 1 INJECTION INTRAMUSCULAR; INTRAVENOUS at 07:00

## 2020-02-09 RX ADMIN — FENTANYL CITRATE 50 MCG: 50 INJECTION, SOLUTION INTRAMUSCULAR; INTRAVENOUS at 07:35

## 2020-02-09 RX ADMIN — CEFAZOLIN SODIUM 2 G: 2 SOLUTION INTRAVENOUS at 15:57

## 2020-02-09 RX ADMIN — PHENYLEPHRINE HYDROCHLORIDE 50 MCG: 10 INJECTION INTRAVENOUS at 07:45

## 2020-02-09 RX ADMIN — ROCURONIUM BROMIDE 40 MG: 10 INJECTION, SOLUTION INTRAVENOUS at 07:05

## 2020-02-09 RX ADMIN — ACETAMINOPHEN 650 MG: 325 TABLET, FILM COATED ORAL at 17:08

## 2020-02-09 RX ADMIN — PHENYLEPHRINE HYDROCHLORIDE 100 MCG: 10 INJECTION INTRAVENOUS at 07:30

## 2020-02-09 RX ADMIN — PHENYLEPHRINE HYDROCHLORIDE 100 MCG: 10 INJECTION INTRAVENOUS at 07:39

## 2020-02-09 ASSESSMENT — PULMONARY FUNCTION TESTS
PIF_VALUE: 17
PIF_VALUE: 19
PIF_VALUE: 18
PIF_VALUE: 17
PIF_VALUE: 1
PIF_VALUE: 31
PIF_VALUE: 19
PIF_VALUE: 17
PIF_VALUE: 18
PIF_VALUE: 28
PIF_VALUE: 17
PIF_VALUE: 0
PIF_VALUE: 18
PIF_VALUE: 18
PIF_VALUE: 19
PIF_VALUE: 0
PIF_VALUE: 17
PIF_VALUE: 15
PIF_VALUE: 19
PIF_VALUE: 15
PIF_VALUE: 2
PIF_VALUE: 14
PIF_VALUE: 19
PIF_VALUE: 19
PIF_VALUE: 18
PIF_VALUE: 19
PIF_VALUE: 18
PIF_VALUE: 19
PIF_VALUE: 14
PIF_VALUE: 14
PIF_VALUE: 18
PIF_VALUE: 18
PIF_VALUE: 11
PIF_VALUE: 23
PIF_VALUE: 19
PIF_VALUE: 1
PIF_VALUE: 17
PIF_VALUE: 19
PIF_VALUE: 1
PIF_VALUE: 19
PIF_VALUE: 16
PIF_VALUE: 17
PIF_VALUE: 17
PIF_VALUE: 1
PIF_VALUE: 17
PIF_VALUE: 2
PIF_VALUE: 18
PIF_VALUE: 6
PIF_VALUE: 10
PIF_VALUE: 17
PIF_VALUE: 18
PIF_VALUE: 19
PIF_VALUE: 1
PIF_VALUE: 18
PIF_VALUE: 1
PIF_VALUE: 18
PIF_VALUE: 19
PIF_VALUE: 10
PIF_VALUE: 0
PIF_VALUE: 17
PIF_VALUE: 15
PIF_VALUE: 16
PIF_VALUE: 18
PIF_VALUE: 20
PIF_VALUE: 17
PIF_VALUE: 17

## 2020-02-09 ASSESSMENT — PAIN SCALES - GENERAL
PAINLEVEL_OUTOF10: 0
PAINLEVEL_OUTOF10: 5
PAINLEVEL_OUTOF10: 0
PAINLEVEL_OUTOF10: 7
PAINLEVEL_OUTOF10: 0

## 2020-02-09 NOTE — PROGRESS NOTES
Chief Complaint:   SDH    Subjective:  Examined in PACU--s/p alexus hole craniotomy   The patient is alert. No problems overnight. Denies chest pain & SOB . Denies abdominal pain. Tolerating diet. No nausea or vomiting. Family at the bedside    Objective:    Vitals:    02/09/20 0430   BP: 122/70   Pulse: 64   Resp: 16   Temp: 98 °F (36.7 °C)   SpO2: 99%     A&O x's 3  Heart:  RRR, no murmurs, gallops, or rubs. Lungs:  CTA bilaterally, no wheeze, rales or rhonchi  Abd: bowel sounds present, nontender, nondistended, no masses  Extrem:  No clubbing, cyanosis, or edema      Lab Results   Component Value Date     02/08/2020    K 3.9 02/08/2020     02/08/2020    CO2 23 02/08/2020    BUN 10 02/08/2020    CREATININE 0.8 02/08/2020    CALCIUM 9.1 02/08/2020      Lab Results   Component Value Date    WBC 7.6 02/08/2020    RBC 4.80 02/08/2020    HGB 14.6 02/08/2020    HCT 44.3 02/08/2020    MCV 92.3 02/08/2020    MCH 30.4 02/08/2020    MCHC 33.0 02/08/2020    RDW 12.8 02/08/2020     02/08/2020    MPV 11.4 02/08/2020     PT/INR:    Lab Results   Component Value Date    PROTIME 11.4 02/07/2020    INR 1.0 02/07/2020     No results for input(s): POCGLU in the last 72 hours. Recent Labs     02/07/20  2145 02/08/20  0611    144   K 4.1 3.9    104   CO2 27 23   BUN 10 10   LABALBU 4.0  --    CREATININE 0.9 0.8   CALCIUM 9.4 9.1   GFRAA >60 >60   LABGLOM >60 >60   GLUCOSE 101* 103*       Ct Head Wo Contrast    Result Date: 2/7/2020  Location: 200 Indication: Subdural hematoma Comparison: CT head from 1/10/2020. Technique: Multidetector CT imaging was obtained from the skull base to vertex without the administration of intravenous contrast. Coronal and sagittal reformatted images were obtained.  Automated dose exposure control was used for this exam. FINDINGS: There is interval enlargement mixed attenuation, predominantly hypodense extra axial collection overlying the right frontoparietal ceFAZolin (ANCEF) IVPB  2 g Intravenous Q8H    sodium chloride flush  10 mL Intravenous 2 times per day    vitamin C  500 mg Oral Daily    metoprolol succinate  12.5 mg Oral Daily    rosuvastatin  20 mg Oral Daily    sodium chloride flush  10 mL Intravenous 2 times per day    levETIRAcetam  500 mg Oral BID     Continuous Infusions:  PRN Meds:.morphine, morphine, HYDROmorphone, HYDROmorphone, HYDROcodone 5 mg - acetaminophen **OR** HYDROcodone 5 mg - acetaminophen, promethazine, meperidine, Lidocaine-EPINEPHrine, bacitracin zinc, sodium chloride flush, acetaminophen, ondansetron, sodium chloride flush    I/O last 3 completed shifts: In: 310 [P.O.:300;  I.V.:10]  Out: 1250 [Urine:1250]  I/O this shift:  In: 600 [I.V.:600]  Out: 50 [Blood:50]    Intake/Output Summary (Last 24 hours) at 2/9/2020 0815  Last data filed at 2/9/2020 0801  Gross per 24 hour   Intake 910 ml   Output 1300 ml   Net -390 ml       Assessment:    Active Hospital Problems    Diagnosis    Coronary artery disease involving native heart with angina pectoris ([facility] [I25.119]     Priority: Medium    HLD (hyperlipidemia) [E78.5]     Priority: Medium    Coronary artery disease [I25.10]    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

## 2020-02-09 NOTE — ANESTHESIA POSTPROCEDURE EVALUATION
Department of Anesthesiology  Postprocedure Note    Patient: Mustapha Blackmon  MRN: [id number]  YOB: 1940  Date of evaluation: 2/9/2020  Time:  9:59 AM     Procedure Summary     Date:  02/09/20 Room / Location:  [address] [place] [address] 20 / [facility]    Anesthesia Start:  4155 Anesthesia Stop:      Procedure:  [place] (Right ) Diagnosis:  (SUBDURAL HEMATOMA)    Surgeon:  Andrew Gunderson MD Responsible Provider:  Justine Vann MD    Anesthesia Type:  general ASA Status:  4          Anesthesia Type: general    Chase Phase I: Chase Score: 10    Chase Phase II:      Last vitals: Reviewed and per EMR flowsheets.        Anesthesia Post Evaluation    Patient location during evaluation: PACU  Patient participation: complete - patient participated  Level of consciousness: awake  Pain score: 3  Airway patency: patent  Nausea & Vomiting: no nausea and no vomiting  Complications: no  Cardiovascular status: blood pressure returned to baseline  Respiratory status: acceptable  Hydration status: euvolemic

## 2020-02-10 ENCOUNTER — APPOINTMENT (OUTPATIENT)
Dept: CT IMAGING | Age: 80
DRG: 027 | End: 2020-02-10
Payer: MEDICARE

## 2020-02-10 PROCEDURE — 97530 THERAPEUTIC ACTIVITIES: CPT

## 2020-02-10 PROCEDURE — 1200000000 HC SEMI PRIVATE

## 2020-02-10 PROCEDURE — 70450 CT HEAD/BRAIN W/O DYE: CPT

## 2020-02-10 PROCEDURE — 6370000000 HC RX 637 (ALT 250 FOR IP): Performed by: PHYSICIAN ASSISTANT

## 2020-02-10 PROCEDURE — 6360000002 HC RX W HCPCS: Performed by: NEUROLOGICAL SURGERY

## 2020-02-10 PROCEDURE — 6370000000 HC RX 637 (ALT 250 FOR IP): Performed by: NEUROLOGICAL SURGERY

## 2020-02-10 PROCEDURE — 97535 SELF CARE MNGMENT TRAINING: CPT

## 2020-02-10 PROCEDURE — APPSS60 APP SPLIT SHARED TIME 46-60 MINUTES: Performed by: NURSE PRACTITIONER

## 2020-02-10 PROCEDURE — 97166 OT EVAL MOD COMPLEX 45 MIN: CPT

## 2020-02-10 PROCEDURE — 2500000003 HC RX 250 WO HCPCS

## 2020-02-10 PROCEDURE — 97164 PT RE-EVAL EST PLAN CARE: CPT

## 2020-02-10 PROCEDURE — 97162 PT EVAL MOD COMPLEX 30 MIN: CPT

## 2020-02-10 PROCEDURE — 2580000003 HC RX 258: Performed by: NEUROLOGICAL SURGERY

## 2020-02-10 PROCEDURE — 99233 SBSQ HOSP IP/OBS HIGH 50: CPT | Performed by: SURGERY

## 2020-02-10 RX ORDER — LABETALOL HYDROCHLORIDE 5 MG/ML
10 INJECTION, SOLUTION INTRAVENOUS EVERY 4 HOURS PRN
Status: DISCONTINUED | OUTPATIENT
Start: 2020-02-10 | End: 2020-02-11 | Stop reason: HOSPADM

## 2020-02-10 RX ORDER — FENTANYL CITRATE 50 UG/ML
INJECTION, SOLUTION INTRAMUSCULAR; INTRAVENOUS
Status: DISCONTINUED
Start: 2020-02-10 | End: 2020-02-10

## 2020-02-10 RX ORDER — LEVETIRACETAM 500 MG/1
500 TABLET ORAL 2 TIMES DAILY
Status: DISCONTINUED | OUTPATIENT
Start: 2020-02-10 | End: 2020-02-11 | Stop reason: HOSPADM

## 2020-02-10 RX ORDER — HYDRALAZINE HYDROCHLORIDE 20 MG/ML
10 INJECTION INTRAMUSCULAR; INTRAVENOUS EVERY 4 HOURS PRN
Status: DISCONTINUED | OUTPATIENT
Start: 2020-02-10 | End: 2020-02-11 | Stop reason: HOSPADM

## 2020-02-10 RX ORDER — LIDOCAINE HYDROCHLORIDE 10 MG/ML
INJECTION, SOLUTION INFILTRATION; PERINEURAL
Status: COMPLETED
Start: 2020-02-10 | End: 2020-02-10

## 2020-02-10 RX ADMIN — CEFAZOLIN SODIUM 2 G: 2 SOLUTION INTRAVENOUS at 06:45

## 2020-02-10 RX ADMIN — ACETAMINOPHEN 650 MG: 325 TABLET, FILM COATED ORAL at 23:00

## 2020-02-10 RX ADMIN — ACETAMINOPHEN 650 MG: 325 TABLET, FILM COATED ORAL at 07:57

## 2020-02-10 RX ADMIN — METOPROLOL SUCCINATE 12.5 MG: 25 TABLET, EXTENDED RELEASE ORAL at 07:58

## 2020-02-10 RX ADMIN — SODIUM CHLORIDE, PRESERVATIVE FREE 10 ML: 5 INJECTION INTRAVENOUS at 21:35

## 2020-02-10 RX ADMIN — ROSUVASTATIN CALCIUM 20 MG: 10 TABLET, FILM COATED ORAL at 07:58

## 2020-02-10 RX ADMIN — Medication 500 MG: at 07:58

## 2020-02-10 RX ADMIN — LIDOCAINE HYDROCHLORIDE: 10 INJECTION, SOLUTION INFILTRATION; PERINEURAL at 12:50

## 2020-02-10 RX ADMIN — SODIUM CHLORIDE, PRESERVATIVE FREE 10 ML: 5 INJECTION INTRAVENOUS at 07:58

## 2020-02-10 RX ADMIN — LEVETIRACETAM 500 MG: 500 SOLUTION ORAL at 07:58

## 2020-02-10 RX ADMIN — LEVETIRACETAM 500 MG: 500 TABLET ORAL at 21:34

## 2020-02-10 RX ADMIN — CEFAZOLIN SODIUM 2 G: 2 SOLUTION INTRAVENOUS at 23:01

## 2020-02-10 RX ADMIN — CEFAZOLIN SODIUM 2 G: 2 SOLUTION INTRAVENOUS at 15:30

## 2020-02-10 ASSESSMENT — PAIN SCALES - GENERAL
PAINLEVEL_OUTOF10: 0
PAINLEVEL_OUTOF10: 3
PAINLEVEL_OUTOF10: 3
PAINLEVEL_OUTOF10: 0
PAINLEVEL_OUTOF10: 4

## 2020-02-10 NOTE — CARE COORDINATION
Sw spoke with Pt and Family. Pt was independent prior to admission. No DME. PCP:Dr. Eduardo Crisostomo. [facility]acy: [facility]. 2 steps to enter ranch home with wife. Declined HHC. wife and 2 Daughters are RN's. Discharge Plan is home with wife,no needs. SW/CM to follow for needs.

## 2020-02-10 NOTE — OP NOTE
[address]                  [address] [facility]  [facility]                                OPERATIVE REPORT    PATIENT NAME: Chelsea Kong                     :        1940  MED REC NO:   [id number]                            ROOM:  ACCOUNT NO:   [de-identified]                           ADMIT DATE: 2020  PROVIDER:     Merritt Martinez MD    DATE OF PROCEDURE:  2020    PREOPERATIVE DIAGNOSIS:  Right frontotemporal subacute-on-chronic  subdural hematoma. POSTOPERATIVE DIAGNOSIS:  Right frontotemporal subacute-on-chronic  subdural hematoma. OPERATION PERFORMED:  1. Right alexus hole drainage of right frontotemporal subacute-on-chronic  subdural hematoma. 2.  Placement of subdural drain. ANESTHESIA:  General endotracheal anesthesia. SURGEON:  Merritt Martinez MD    ASSISTANT:  None. COMPLICATIONS:  None. ESTIMATED BLOOD LOSS:  100 mL. SPECIMEN:  None. INDICATIONS:  The patient is a 71-year-old gentleman who hit his head  while he was out of town in [place], developed a subdural  hematoma. He had been seen in the office. The subdural hematoma was  stable, however, over the last few days, he has had worsening headaches  and left arm and left side weakness. He ultimately had a CT scan that  showed enlarging subdural hematoma and after risks, benefits, and  alternatives were discussed with the patient, it was determined that he  would undergo the above-listed procedure. OPERATIVE PROCEDURE:  The patient was brought into the operating room. A timeout was performed where he was identified by his name, medical  record number, and the operative procedure which he was about to  undergo. Next, induction of generalized endotracheal anesthesia was  then commenced. Upon completion of induction of generalized  endotracheal anesthesia, he received preoperative antibiotics.   He was  then positioned on the operating table with a

## 2020-02-10 NOTE — PROGRESS NOTES
Chief Complaint:   SDH    Subjective: The patient is alert. Urine retention last night. Denies chest pain & SOB . Denies abdominal pain. Tolerating diet. No nausea or vomiting. Family at the bedside    Objective:    Vitals:    02/10/20 0600   BP: 121/66   Pulse: 68   Resp: 9   Temp:    SpO2: 93%     A&O x's 3  Heart:  RRR, no murmurs, gallops, or rubs. Lungs:  CTA bilaterally, no wheeze, rales or rhonchi  Abd: bowel sounds present, nontender, nondistended, no masses  Extrem:  No clubbing, cyanosis, or edema  Tele: NSR    Lab Results   Component Value Date     02/08/2020    K 3.9 02/08/2020     02/08/2020    CO2 23 02/08/2020    BUN 10 02/08/2020    CREATININE 0.8 02/08/2020    CALCIUM 9.1 02/08/2020      Lab Results   Component Value Date    WBC 7.6 02/08/2020    RBC 4.80 02/08/2020    HGB 14.6 02/08/2020    HCT 44.3 02/08/2020    MCV 92.3 02/08/2020    MCH 30.4 02/08/2020    MCHC 33.0 02/08/2020    RDW 12.8 02/08/2020     02/08/2020    MPV 11.4 02/08/2020     PT/INR:    Lab Results   Component Value Date    PROTIME 11.4 02/07/2020    INR 1.0 02/07/2020     No results for input(s): POCGLU in the last 72 hours. Recent Labs     02/07/20  2145 02/08/20  0611    144   K 4.1 3.9    104   CO2 27 23   BUN 10 10   LABALBU 4.0  --    CREATININE 0.9 0.8   CALCIUM 9.4 9.1   GFRAA >60 >60   LABGLOM >60 >60   GLUCOSE 101* 103*       Ct Head Wo Contrast    Result Date: 2/7/2020  Location: 200 Indication: Subdural hematoma Comparison: CT head from 1/10/2020. Technique: Multidetector CT imaging was obtained from the skull base to vertex without the administration of intravenous contrast. Coronal and sagittal reformatted images were obtained.  Automated dose exposure control was used for this exam. FINDINGS: There is interval enlargement mixed attenuation, predominantly hypodense extra axial collection overlying the right frontoparietal convexity measuring up to 29 mm in maximal thickness (series 5, image 40). There is mild layering hyperdensity. The wall the collection demonstrates interval thin hyperdensity. There is regional mass effect with approximately 4 mm of right-to-left midline shift. Prominence of ventricles and sulci is compatible with age-related volume loss. No acute intracranial hemorrhage. There are few foci of hypoattenuation within the cerebral white matter, which are nonspecific but most compatible with changes of microangiopathy. No CT evidence of acute, large territorial infarction. Visualized paranasal sinuses and mastoid air cells are well aerated. Calvarium is intact. Interval enlargement of a mixed attenuation extra axial collection overlying the right frontoparietal convexity with thin hyperdensity of the wall, possibly related to calcification. There is regional mass effect with approximately 4 mm of right-to-left midline shift. These findings were communicated to radiology department personnel by abnormal results form for further communication to the requesting physician at the conclusion of this examination, per hospital protocol. Xr Chest Portable    Result Date: 2020  Patient MRN: [id number] : 1940 Age:  67 years Gender: Male Order Date: 2020 7:15 AM Exam: XR CHEST PORTABLE Number of Images: 1 view Indication:   preop preop Comparison: Prior chest radiograph from 2015 is available. Findings: The lungs are clear. There is no evidence of pulmonary infiltrate or pleural effusion. The pulmonary vascularity is unremarkable. The cardiac, hilar and mediastinal silhouettes are satisfactory. Patient is status post median sternotomy with cardiac revascularization procedure. The bony thorax demonstrates no gross abnormality. NO ACUTE CARDIOPULMONARY PROCESS Status post median sternotomy with cardiac revascularization procedure.  The study is unchanged from prior study       Scheduled Meds:   sodium chloride flush  10 mL Intravenous 2 times per

## 2020-02-10 NOTE — PROGRESS NOTES
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 *    Neuro:  S/p alexus hole crani evacuation SDH with drain. Neurosurgery following. Monitor neuro status. Drain discontinued. Keppra. CV: No acute issues. SBP goal <140 mm Hg. Metoprolol. Crestor. Pulm: No acute issues. GI: Tolerating diet. Renal: No acute issues. ID: No acute issues. Afebrile  Endocrine:  No acute issues. MSK:. PT/OT   Heme:No acute issues. Bowel regime: Senna   Pain control/Sedation: Oxycodone. Acetaminophen   DVT prophylaxis: SCDs. Chakraborty: Keep in place for critical care monitoring of fluid balance. Consults: Neurosurgery   Patient/Family update: Pt updated. Questions answered. Code status:  Full      Disposition:  Transfer from NSICU.         JACKELIN Collins  2/10/2020  7:03 AM

## 2020-02-10 NOTE — PROGRESS NOTES
Occupational Therapy  OCCUPATIONAL THERAPY INITIAL EVALUATION      Date:2/10/2020  Patient Name: Mustapha Blackmon  MRN: [id number]  : 1940  Room: [address] [place] [address]    Referring Provider: Bailey Lindsay DO    Modified [place] Scale   Score     Description  0             No symptoms  1             No significant disability despite symptoms  2             Slight disability; able to look after own affairs  3             Moderate disability; able to ambulate without assist/ requires assist with ADLs  4             Moderate/Severe disability;requires assist to ambulate/assist with ADLs  5             Severe disability;bedridden/incontinent   6               Score:  2    Evaluating OT: MICHA Guan/L [id number]    AM-PAC Daily Activity Raw Score:     Recommended Adaptive Equipment:  bathroom DME (shower chair for fall prevention)    Comments: Based on patient's functional performance as stated above and level of assistance needed prior to admission, this therapist believes that the patient would benefit from continued skilled OT during/following hospital stay in an effort to increase safety, functional independence with ADLs/IADLs, and quality of life. Reason for admission:  Fall off ladder in 2019; abnormal L hand movement, shuffling L foot  Diagnosis: SDH  Procedure:   Right alexus hole drainage of right frontotemporal subacute-on-chronic subdural hematoma; Placement of subdural drain. 2/10 removal of subdural drain  Pertinent Medical History: aneurysm of aorta, CAD, HLD, HTN, neck pain, PAD, pulmonary emboli, TIA, UTI    Precautions:  Falls, general diet     Home Living: Pt lives with wife in a 15 story home with 2 step(s) to enter and 1 rail(s); bed/bath on 1st floor; no need to access basement (freezer)   Bathroom setup: Pt using tub/ shower with grab bar; standard toilet  Equipment owned: none    Prior Level of Function: Indep with ADLs;  Indep with IADLs; using no device for techniques) and grooming tasks to increase activity tolerance and independence; proper hand placement and and posture during transfers and functional mobility. Therapist provided skilled monitoring of HR, O2 saturation, blood pressure and patients response during treatment session. Pt/family additionally educated on OT POC, OT role, OT d/c plan with shower chair recommendation for fall prevention, importance of EOB/OOB activity and completing ADL tasks daily as IND as possible to aide in recovery process (with staff supervision), fall risk precautions/call light use. Eval Complexity:   · Mod Complexity  · History: Expanded review of medical records and additional review of physical, cognitive, or psychosocial history related to current functional performance  · Exam: 3+ performance deficits  · Assistance/Modification: Min/mod assistance or modifications required to perform tasks. May have comorbidities that affect occupational performance.     Assessment of current deficits:   Functional mobility [x]  ADLs [x] Strength [x]  Cognition []  Functional transfers  [x] IADLs [x] Safety Awareness [x]  Endurance [x]  Fine Motor Coordination [] Balance [x] Vision/perception [] Sensation []   Gross Motor Coordination [] ROM [] Delirium []                  Motor Control []    Plan of Care: 1-3x/week prn  ADL retraining [x]   Equipment needs [x]   Neuromuscular re-education [x] Energy Conservation Techniques [x]  Functional Transfer training [x] Patient and/or Family Education [x]  Functional Mobility training [x]  Environmental Modifications [x]  Cognitive re-training [x]   Compensatory techniques for ADLs [x]  Splinting Needs []   Positioning to improve overall function [x]   Therapeutic Activity [x]                       Therapeutic Exercise  [x]  Visual/Perceptual: []    Delirium prevention/treatment  [x]   Other:  []    Rehab Potential: Good for established goals    Patient / Family Goal: return home with wife    Patient and/or family were instructed/educated on diagnosis, prognosis/goals and plan of care. Demonstrated G understanding, further information not needed. [] Malnutrition indicators have been identified and nursing has been notified to ensure a dietitian consult is ordered. Evaluation time includes thorough review of current medical information, gathering information on past medical & social history & PLOF, completion of standardized testing, informal observation of tasks, consultation with other medical professions/disciplines, assessment of data & development of POC/goals.      Mod Evaluation +     Treatment Time In: 1340            Treatment Time Out: 0518              Treatment Charges: Mins Units   Ther Ex  41498     Manual Therapy 14024     Thera Activities 34388 10 1   ADL/Home Mgt 54439 15 1   Neuro Re-ed 94034     Group Therapy      Orthotic manage/training  41396     Non-Billable Time     Total Timed Treatment 25 2        Summer Pollack OTR/L   License #  [id number]

## 2020-02-10 NOTE — PROGRESS NOTES
Physical Therapy    Physical Therapy Initial Assessment     Name: Reg Renee  : 1940  MRN: [id number]    Referring Provider:  Gladys Camejo DO    Date of Service: 2/10/2020    Evaluating PT:  Connor Mark, PT, DPT [id number]     Room #:  7587/7986-D  Diagnosis:  SDH (with recent midline shift)  Procedure:  R alexus hole crani on 20   HPI:  Pt with fall on 19 and resultant SDH. Has been being monitored since initial injury. Daughter and pt report episode of L hand tremor and dropping a dish the other day which was new for pt. PMHx/PSHx:  AA, CAD, HLD, HTN, PAD, pulmonary emboli, TIA, UTI, hx of CABG  Precautions:  Falls     Re-evaluation d/t procedure on 20     SUBJECTIVE:    Pt lives with wife in a 1 story home with 2 stairs to enter and 1 rail. Bedroom and bathroom are on the 1st level. Pt ambulated with no AD, independent, drives PTA. Pt very independent and active. OBJECTIVE:   Initial Evaluation  Date: 2/10/20 Treatment Short Term/ Long Term   Goals   AM-PAC 6 Clicks 35/80     Was pt agreeable to Eval/treatment? Yes      Does pt have pain? None      Bed Mobility  Rolling: SBA  Supine to sit: SBA  Sit to supine: NT  Scooting: SBA  Rolling: Independent   Supine to sit: Independent   Sit to supine: Independent   Scooting: Independent    Transfers Sit to stand: SBA  Stand to sit: SBA  Stand pivot: SBA  Sit to stand: Independent   Stand to sit:  Independent   Stand pivot: Independent    Ambulation    300 feet with no AD SBA  >500 feet with no AD Independent    Stair negotiation: ascended and descended  6 steps with 1 rail SBA  >12 steps with 1 rail Modified Independent     ROM BUE:  Per OT eval  BLE:  WFL     Strength BUE:  Per OT eval   BLE:  Grossly 5/5     Balance Sitting EOB:  SBA  Dynamic Standing:  SBA  Sitting EOB:  Independent   Dynamic Standing:  Independent      Pt is A & O x 4  RASS:  0  CAM-ICU:  Negative   Sensation:  Pt denies numbness and tingling to

## 2020-02-10 NOTE — PROGRESS NOTES
Department of Neurosurgery  Progress Note    CHIEF COMPLAINT: s/p right alexus hole crani with evacuation of SDH 2/9    SUBJECTIVE:  Awake and alert. FC in all extremities. Denies headache. Drain removed. No new issues overnight. REVIEW OF SYSTEMS :  Constitutional: Negative for chills and fever. Neurological: Negative for dizziness, tremors and speech change. OBJECTIVE:   VITALS:  /70   Pulse 77   Temp 97.7 °F (36.5 °C) (Oral)   Resp 20   Ht 6' (1.829 m)   Wt 184 lb (83.5 kg)   SpO2 93%   BMI 24.95 kg/m²     PHYSICAL:  Constitutional: Appears well-nourished. Head: Normocephalic and atraumatic. Eyes: EOM are normal. Pupils are equal, round, and reactive to light. Neck: Normal range of motion. No tracheal deviation present. Cardiovascular: Normal rate. Pulmonary/Chest: No stridor. Abdominal: No distension. Neurological:   Alert and oriented x3  Face symmetric  Moves all extremities well  Sensation intact to light touch     Incision c/d/i  Skin: Skin is warm and dry.    Psychiatric: Thought content normal.       DATA:  CBC:   Lab Results   Component Value Date    WBC 7.6 02/08/2020    RBC 4.80 02/08/2020    HGB 14.6 02/08/2020    HCT 44.3 02/08/2020    MCV 92.3 02/08/2020    MCH 30.4 02/08/2020    MCHC 33.0 02/08/2020    RDW 12.8 02/08/2020     02/08/2020    MPV 11.4 02/08/2020     BMP:    Lab Results   Component Value Date     02/08/2020    K 3.9 02/08/2020     02/08/2020    CO2 23 02/08/2020    BUN 10 02/08/2020    LABALBU 4.0 02/07/2020    LABALBU 4.5 03/19/2012    CREATININE 0.8 02/08/2020    CALCIUM 9.1 02/08/2020    GFRAA >60 02/08/2020    LABGLOM >60 02/08/2020    GLUCOSE 103 02/08/2020    GLUCOSE 118 03/19/2012     PT/INR:    Lab Results   Component Value Date    PROTIME 11.4 02/07/2020    INR 1.0 02/07/2020     PTT:    Lab Results   Component Value Date    APTT 21.4 02/07/2020   [APTT}    Current Inpatient Medications  Current Facility-Administered Medications: lidocaine 1 % injection, , ,   sodium chloride flush 0.9 % injection 10 mL, 10 mL, Intravenous, 2 times per day  sodium chloride flush 0.9 % injection 10 mL, 10 mL, Intravenous, PRN  ondansetron (ZOFRAN) injection 4 mg, 4 mg, Intravenous, Q6H PRN  ceFAZolin (ANCEF) 2 g in dextrose 3 % 50 mL IVPB (duplex), 2 g, Intravenous, Q8H  oxyCODONE (ROXICODONE) immediate release tablet 5 mg, 5 mg, Oral, Q4H PRN **OR** oxyCODONE (ROXICODONE) immediate release tablet 10 mg, 10 mg, Oral, Q4H PRN  labetalol (NORMODYNE;TRANDATE) injection 10 mg, 10 mg, Intravenous, Q10 Min PRN  hydrALAZINE (APRESOLINE) injection 10 mg, 10 mg, Intravenous, Q10 Min PRN  vitamin C (ASCORBIC ACID) tablet 500 mg, 500 mg, Oral, Daily  metoprolol succinate (TOPROL XL) extended release tablet 12.5 mg, 12.5 mg, Oral, Daily  rosuvastatin (CRESTOR) tablet 20 mg, 20 mg, Oral, Daily  acetaminophen (TYLENOL) tablet 650 mg, 650 mg, Oral, Q4H PRN  levETIRAcetam (KEPPRA) 100 MG/ML solution 500 mg, 500 mg, Oral, BID    ASSESSMENT:   s/p right alexus hole crani with evacuation of SDH 2/9    2/10: subdural drain removed    PLAN:  -No anticoagulation   -PT/OT  -Pain control  -Medical management   -Okay to transfer out of the ICU  -Follow up in neurosurgery clinic in 4 weeks with repeat head CT      Electronically signed by Sara Ramsey PA-C on 2/10/2020 at 11:15 AM     I have examined the patient and agree with above.   He had a right subdural hematoma causing brain compression that was treated with bur hole drainage of the subdural hematoma    Trudy Jean Baptiste

## 2020-02-11 VITALS
OXYGEN SATURATION: 96 % | HEIGHT: 72 IN | BODY MASS INDEX: 24.92 KG/M2 | SYSTOLIC BLOOD PRESSURE: 138 MMHG | RESPIRATION RATE: 18 BRPM | TEMPERATURE: 98.3 F | WEIGHT: 184 LBS | DIASTOLIC BLOOD PRESSURE: 64 MMHG | HEART RATE: 61 BPM

## 2020-02-11 LAB — MRSA CULTURE ONLY: NORMAL

## 2020-02-11 PROCEDURE — 6370000000 HC RX 637 (ALT 250 FOR IP): Performed by: NURSE PRACTITIONER

## 2020-02-11 PROCEDURE — 6370000000 HC RX 637 (ALT 250 FOR IP): Performed by: NEUROLOGICAL SURGERY

## 2020-02-11 PROCEDURE — 6360000002 HC RX W HCPCS: Performed by: NEUROLOGICAL SURGERY

## 2020-02-11 PROCEDURE — 2580000003 HC RX 258: Performed by: NEUROLOGICAL SURGERY

## 2020-02-11 RX ORDER — OXYCODONE HYDROCHLORIDE 5 MG/1
5 TABLET ORAL EVERY 4 HOURS PRN
Qty: 42 TABLET | Refills: 0 | Status: SHIPPED | OUTPATIENT
Start: 2020-02-11 | End: 2020-02-18

## 2020-02-11 RX ORDER — LEVETIRACETAM 500 MG/1
500 TABLET ORAL 2 TIMES DAILY
Qty: 14 TABLET | Refills: 0 | Status: SHIPPED | OUTPATIENT
Start: 2020-02-11 | End: 2021-05-06 | Stop reason: ALTCHOICE

## 2020-02-11 RX ADMIN — LEVETIRACETAM 500 MG: 500 TABLET ORAL at 09:29

## 2020-02-11 RX ADMIN — ROSUVASTATIN CALCIUM 20 MG: 10 TABLET, FILM COATED ORAL at 09:30

## 2020-02-11 RX ADMIN — CEFAZOLIN SODIUM 2 G: 2 SOLUTION INTRAVENOUS at 06:44

## 2020-02-11 RX ADMIN — Medication 500 MG: at 09:30

## 2020-02-11 RX ADMIN — METOPROLOL SUCCINATE 12.5 MG: 25 TABLET, EXTENDED RELEASE ORAL at 09:30

## 2020-02-11 RX ADMIN — SODIUM CHLORIDE, PRESERVATIVE FREE 10 ML: 5 INJECTION INTRAVENOUS at 09:30

## 2020-02-11 ASSESSMENT — PAIN DESCRIPTION - LOCATION: LOCATION: HEAD

## 2020-02-11 ASSESSMENT — PAIN SCALES - GENERAL
PAINLEVEL_OUTOF10: 0
PAINLEVEL_OUTOF10: 3

## 2020-02-11 ASSESSMENT — PAIN DESCRIPTION - PAIN TYPE: TYPE: ACUTE PAIN

## 2020-02-11 ASSESSMENT — PAIN DESCRIPTION - ORIENTATION: ORIENTATION: UPPER

## 2020-02-11 ASSESSMENT — PAIN DESCRIPTION - FREQUENCY: FREQUENCY: CONTINUOUS

## 2020-02-11 ASSESSMENT — PAIN DESCRIPTION - PROGRESSION: CLINICAL_PROGRESSION: GRADUALLY IMPROVING

## 2020-02-11 ASSESSMENT — PAIN DESCRIPTION - DESCRIPTORS: DESCRIPTORS: HEADACHE;DULL;DISCOMFORT

## 2020-02-11 NOTE — PLAN OF CARE
Problem: Falls - Risk of:  Goal: Will remain free from falls  Description  Will remain free from falls  2/11/2020 1155 by Marlen Yang RN  Outcome: Met This Shift  2/11/2020 0103 by Isa Pollack  Outcome: Met This Shift  Goal: Absence of physical injury  Description  Absence of physical injury  2/11/2020 1155 by Marlen Yang RN  Outcome: Met This Shift  2/11/2020 0103 by Isa Pollack  Outcome: Met This Shift

## 2020-02-11 NOTE — PROGRESS NOTES
[facility]   Department of Pharmacy   Pharmacist Transition of Care Services         Patient Demographics  Name:  Deena Jimenez   Medical Record Number:  [id number]  Gender:  male   Age:  67 y.o. YOB: 1940    Primary Care Physician: Aubrey Gonzalez DO  Primary Care Physician phone number:  [phone]  Readmission Risk (% from EPIC Patient List): 9%     Patient plans to participate in [facility] Meds to [facility] (Y/N): N    Pharmacist Review and Summary of Medications     Date of last reviewed/update: 2/11/20     Category Comments   New Medication Started   1. Oxycodone 5 mg PO Q4H PRN pain   2. Levetiracetam 500 mg PO BID x 7 days          Change in Outpatient Medication  (Dosage Form, Route,   Dose, or Frequency) 1. Discontinued Outpatient Medication   (or on Hold During Admission) 1. Other              Pharmacist Patient Education:    Date  Person Educated Content of Education                 Documentation of Pharmacist Interventions and Follow-up Plan:     The following Pharmacist Transition of Care Services were completed:   []  Reviewed and summarized medication changes  []  Entire home medication list was reviewed for accuracy (sources: **)  []  Home medication list was updated or corrected     [x]  Reviewed discharge medication reconciliation  []  Discharge medication list was updated or corrected  []  Patient education was provided on new medications  []  Patient education was provided on medication changes  []  Reviewed the After Visit Summary (AVS) with patient    Additional Interventions:  []  Inpatient prescriber was contacted and the following pharmacy recommendations        were accepted: **     [] Other interventions: **        Pharmacist: Shade Gallego PharmD, [facility]  Date:  2/11/2020 1:45 PM

## 2020-02-11 NOTE — DISCHARGE SUMMARY
Physician Discharge Summary     Patient ID:  Samantha Crawley  [id number]  6 y.o.  1940    Admit date: 2/7/2020    Discharge date and time: 2/11/2020     Admission Diagnoses: SDH (subdural hematoma) ([facility] [address] [S06.5X9A]  SDH (subdural hematoma) ([facility] [S06.5X9A]  SAH (subarachnoid hemorrhage) ([facility] [I60.9]    Discharge Diagnoses: Active Hospital Problems    Diagnosis    HLD (hyperlipidemia) [E78.5]     Priority: Medium    Coronary artery disease [I25.10]    Seizure after head injury ([facility] [R56.1]    SDH (subdural hematoma) ([facility] [S06.5X9A]    SAH (subarachnoid hemorrhage) (HCC) [I60.9]    Traumatic subarachnoid hemorrhage ([facility] [S06.6X9A]    Type 2 diabetes mellitus without complication, without long-term current use of insulin ([facility] [E11.9]       Consults: neurosurgery    Procedures:  [address] Course:  Admitted following an OP head CT showing an expanding SDH from previous fall d/t head trauma. A midline shift noted and NS consult obtained. Craniotomy with alexus holes placed w/o complications. The patient is discharged today in improved and stable condition with OP f/u as directed. Cardiac and DM2 status remained well controlled thru out the hospital stay.      CBC with Differential:    Lab Results   Component Value Date    WBC 7.6 02/08/2020    RBC 4.80 02/08/2020    HGB 14.6 02/08/2020    HCT 44.3 02/08/2020     02/08/2020    MCV 92.3 02/08/2020    MCH 30.4 02/08/2020    MCHC 33.0 02/08/2020    RDW 12.8 02/08/2020    BANDSPCT 1 05/07/2015    LYMPHOPCT 30.7 02/07/2020    MONOPCT 11.5 02/07/2020    MYELOPCT 1 05/11/2015    BASOPCT 0.7 02/07/2020    MONOSABS 0.98 02/07/2020    LYMPHSABS 2.62 02/07/2020    EOSABS 0.41 02/07/2020    BASOSABS 0.06 02/07/2020     CMP:    Lab Results   Component Value Date     02/08/2020    K 3.9 02/08/2020     02/08/2020    CO2 23 02/08/2020    BUN 10 02/08/2020    CREATININE 0.8 02/08/2020    GFRAA >60 02/08/2020    LABGLOM >60 diet    Follow-up with Dr. Jie Barajas in 1 week.     Note that over 30 minutes was spent in preparing discharge papers, discussing discharge with patient, medication review, etc.    Signed:  Renee Gil DO  2/11/2020  11:44 AM

## 2020-02-11 NOTE — PROGRESS NOTES
Department of Neurosurgery  Progress Note    CHIEF COMPLAINT: s/p right alexus hole crani with evacuation of SDH 2/9    SUBJECTIVE:  Denies complaints. No new issues overnight. REVIEW OF SYSTEMS :  Constitutional: Negative for chills and fever. Neurological: Negative for dizziness, tremors and speech change. OBJECTIVE:   VITALS:  /64   Pulse 61   Temp 98.3 °F (36.8 °C) (Temporal)   Resp 18   Ht 6' (1.829 m)   Wt 184 lb (83.5 kg)   SpO2 96%   BMI 24.95 kg/m²     PHYSICAL:  Constitutional: Appears well-nourished. Head: Normocephalic and atraumatic. Eyes: EOM are normal. Pupils are equal, round, and reactive to light. Neck: Normal range of motion. No tracheal deviation present. Cardiovascular: Normal rate. Pulmonary/Chest: No stridor. Abdominal: No distension. Neurological:   Alert and oriented x3  Face symmetric  Moves all extremities well  Sensation intact to light touch     Incision c/d/i  Skin: Skin is warm and dry.    Psychiatric: Thought content normal.       DATA:  CBC:   Lab Results   Component Value Date    WBC 7.6 02/08/2020    RBC 4.80 02/08/2020    HGB 14.6 02/08/2020    HCT 44.3 02/08/2020    MCV 92.3 02/08/2020    MCH 30.4 02/08/2020    MCHC 33.0 02/08/2020    RDW 12.8 02/08/2020     02/08/2020    MPV 11.4 02/08/2020     BMP:    Lab Results   Component Value Date     02/08/2020    K 3.9 02/08/2020     02/08/2020    CO2 23 02/08/2020    BUN 10 02/08/2020    LABALBU 4.0 02/07/2020    LABALBU 4.5 03/19/2012    CREATININE 0.8 02/08/2020    CALCIUM 9.1 02/08/2020    GFRAA >60 02/08/2020    LABGLOM >60 02/08/2020    GLUCOSE 103 02/08/2020    GLUCOSE 118 03/19/2012     PT/INR:    Lab Results   Component Value Date    PROTIME 11.4 02/07/2020    INR 1.0 02/07/2020     PTT:    Lab Results   Component Value Date    APTT 21.4 02/07/2020   [APTT}    Current Inpatient Medications  Current Facility-Administered Medications: magnesium hydroxide (MILK OF MAGNESIA) 400

## 2020-02-11 NOTE — PROGRESS NOTES
CLINICAL PHARMACY: DISCHARGE MED RECONCILIATION/REVIEW    [facility] ([facility]) Select Patient?: Yes  Total # of Interventions Recommended: 0   -   Total # Interventions Accepted: 0  Intervention Severity:   - Level 1 Intervention Present?: No   - Level 2 #: 0   - Level 3 #: 0   Time Spent (min): 6801 Payal Wong, Pharm D 2/11/2020 1:47 PM

## 2020-02-11 NOTE — PROGRESS NOTES
Patient's last BM was 2/9/2020. Patient stated he does not want any milk of mag at this time. Patient stated he is passing gas frequently. Patient also stated that at home he normally has a BM every 2-3 days. Patient educated that milk of mag is available to him if need be. Patient has active and audible bowel sounds at this time.    Electronically signed by Afshin Gillis RN on 2/11/2020 at 11:54 AM

## 2020-02-11 NOTE — PROGRESS NOTES
hyperdensity. The wall the collection demonstrates interval thin hyperdensity. There is regional mass effect with approximately 4 mm of right-to-left midline shift. Prominence of ventricles and sulci is compatible with age-related volume loss. No acute intracranial hemorrhage. There are few foci of hypoattenuation within the cerebral white matter, which are nonspecific but most compatible with changes of microangiopathy. No CT evidence of acute, large territorial infarction. Visualized paranasal sinuses and mastoid air cells are well aerated. Calvarium is intact. Interval enlargement of a mixed attenuation extra axial collection overlying the right frontoparietal convexity with thin hyperdensity of the wall, possibly related to calcification. There is regional mass effect with approximately 4 mm of right-to-left midline shift. These findings were communicated to radiology department personnel by abnormal results form for further communication to the requesting physician at the conclusion of this examination, per hospital protocol. Xr Chest Portable    Result Date: 2020  Patient MRN: [id number] : 1940 Age:  67 years Gender: Male Order Date: 2020 7:15 AM Exam: XR CHEST PORTABLE Number of Images: 1 view Indication:   preop preop Comparison: Prior chest radiograph from 2015 is available. Findings: The lungs are clear. There is no evidence of pulmonary infiltrate or pleural effusion. The pulmonary vascularity is unremarkable. The cardiac, hilar and mediastinal silhouettes are satisfactory. Patient is status post median sternotomy with cardiac revascularization procedure. The bony thorax demonstrates no gross abnormality. NO ACUTE CARDIOPULMONARY PROCESS Status post median sternotomy with cardiac revascularization procedure.  The study is unchanged from prior study       Scheduled Meds:   levETIRAcetam  500 mg Oral BID    sodium chloride flush  10 mL Intravenous 2 times per day    vitamin C  500 mg Oral Daily    metoprolol succinate  12.5 mg Oral Daily    rosuvastatin  20 mg Oral Daily     Continuous Infusions:  PRN Meds:.magnesium hydroxide, hydrALAZINE, labetalol, sodium chloride flush, ondansetron, oxyCODONE **OR** oxyCODONE, acetaminophen    I/O last 3 completed shifts:  In: -   Out: 2035 [Urine:2035]  No intake/output data recorded.     Intake/Output Summary (Last 24 hours) at 2/11/2020 1105  Last data filed at 2/11/2020 0303  Gross per 24 hour   Intake --   Output 1405 ml   Net -1405 ml       Assessment:    Active Hospital Problems    Diagnosis    Coronary artery disease involving native heart with angina pectoris ([address] [I25.119]     Priority: Medium    HLD (hyperlipidemia) [E78.5]     Priority: Medium    Coronary artery disease [I25.10]    Seizure after head injury ([address] [R56.1]    SDH (subdural hematoma) ([address] [M34.0D8R]    SAH (subarachnoid hemorrhage) ([address] [I60.9]    Traumatic subarachnoid hemorrhage ([facility] [S06.6X9A]    Type 2 diabetes mellitus without complication, without long-term current use of insulin ([address] [E11.9]       Plan:  S/P OR              Clinically stable             BS minimally elevated              Discharge today        Maricruz Edwards DO  11:05 AM  2/11/2020

## 2020-02-26 ENCOUNTER — OFFICE VISIT (OUTPATIENT)
Dept: NEUROSURGERY | Age: 80
End: 2020-02-26

## 2020-02-26 VITALS
WEIGHT: 194.2 LBS | BODY MASS INDEX: 25.74 KG/M2 | DIASTOLIC BLOOD PRESSURE: 74 MMHG | HEIGHT: 73 IN | HEART RATE: 60 BPM | SYSTOLIC BLOOD PRESSURE: 159 MMHG

## 2020-02-26 LAB
EKG ATRIAL RATE: 60 BPM
EKG P AXIS: -24 DEGREES
EKG P-R INTERVAL: 180 MS
EKG Q-T INTERVAL: 440 MS
EKG QRS DURATION: 92 MS
EKG QTC CALCULATION (BAZETT): 440 MS
EKG R AXIS: 50 DEGREES
EKG T AXIS: 75 DEGREES
EKG VENTRICULAR RATE: 60 BPM

## 2020-02-26 PROCEDURE — 99024 POSTOP FOLLOW-UP VISIT: CPT | Performed by: PHYSICIAN ASSISTANT

## 2020-03-11 ENCOUNTER — HOSPITAL ENCOUNTER (OUTPATIENT)
Dept: CT IMAGING | Age: 80
Discharge: HOME | End: 2020-03-11
Payer: MEDICARE

## 2020-03-11 PROCEDURE — 70450 CT HEAD/BRAIN W/O DYE: CPT

## 2020-03-12 ENCOUNTER — OFFICE VISIT (OUTPATIENT)
Dept: NEUROSURGERY | Age: 80
End: 2020-03-12

## 2020-03-12 VITALS
SYSTOLIC BLOOD PRESSURE: 143 MMHG | WEIGHT: 195.2 LBS | DIASTOLIC BLOOD PRESSURE: 75 MMHG | HEIGHT: 73 IN | HEART RATE: 59 BPM | BODY MASS INDEX: 25.87 KG/M2

## 2020-03-12 PROCEDURE — 99024 POSTOP FOLLOW-UP VISIT: CPT | Performed by: NEUROLOGICAL SURGERY

## 2020-03-17 ENCOUNTER — TELEPHONE (OUTPATIENT)
Dept: NEUROSURGERY | Age: 80
End: 2020-03-17

## 2020-03-17 NOTE — TELEPHONE ENCOUNTER
Wife Dewayne Velez called and stated  walked to the bathroom this AM and lost his balance going to the left and fell. Wife check BP and Temp which were both normal. Pt seemed ok for awhile, then had another dizzy spell when he went to the bathroom and sat on the commode. Wife stated pt did some trimming outside and didn't know if it was from too much looking up and down.

## 2020-04-10 ENCOUNTER — HOSPITAL ENCOUNTER (EMERGENCY)
Age: 80
Discharge: HOME | End: 2020-04-10
Attending: EMERGENCY MEDICINE
Payer: MEDICARE

## 2020-04-10 ENCOUNTER — APPOINTMENT (OUTPATIENT)
Dept: GENERAL RADIOLOGY | Age: 80
End: 2020-04-10
Payer: MEDICARE

## 2020-04-10 ENCOUNTER — HOSPITAL ENCOUNTER (EMERGENCY)
Age: 80
Discharge: HOME | End: 2020-04-10
Attending: NURSE PRACTITIONER
Payer: MEDICARE

## 2020-04-10 VITALS
HEART RATE: 56 BPM | SYSTOLIC BLOOD PRESSURE: 175 MMHG | DIASTOLIC BLOOD PRESSURE: 74 MMHG | RESPIRATION RATE: 20 BRPM | WEIGHT: 182 LBS | TEMPERATURE: 98.2 F | OXYGEN SATURATION: 99 % | BODY MASS INDEX: 24.01 KG/M2

## 2020-04-10 VITALS
OXYGEN SATURATION: 96 % | RESPIRATION RATE: 18 BRPM | BODY MASS INDEX: 24.92 KG/M2 | HEIGHT: 72 IN | DIASTOLIC BLOOD PRESSURE: 84 MMHG | HEART RATE: 76 BPM | SYSTOLIC BLOOD PRESSURE: 172 MMHG | TEMPERATURE: 98.4 F | WEIGHT: 184 LBS

## 2020-04-10 PROCEDURE — 90471 IMMUNIZATION ADMIN: CPT | Performed by: EMERGENCY MEDICINE

## 2020-04-10 PROCEDURE — 99282 EMERGENCY DEPT VISIT SF MDM: CPT

## 2020-04-10 PROCEDURE — 73560 X-RAY EXAM OF KNEE 1 OR 2: CPT

## 2020-04-10 PROCEDURE — 90715 TDAP VACCINE 7 YRS/> IM: CPT | Performed by: EMERGENCY MEDICINE

## 2020-04-10 PROCEDURE — 6360000002 HC RX W HCPCS: Performed by: EMERGENCY MEDICINE

## 2020-04-10 PROCEDURE — 99212 OFFICE O/P EST SF 10 MIN: CPT

## 2020-04-10 RX ORDER — CEPHALEXIN 500 MG/1
500 CAPSULE ORAL 3 TIMES DAILY
Qty: 21 CAPSULE | Refills: 0 | Status: SHIPPED | OUTPATIENT
Start: 2020-04-10 | End: 2020-04-14 | Stop reason: SDUPTHER

## 2020-04-10 RX ADMIN — TETANUS TOXOID, REDUCED DIPHTHERIA TOXOID AND ACELLULAR PERTUSSIS VACCINE, ADSORBED 0.5 ML: 5; 2.5; 8; 8; 2.5 SUSPENSION INTRAMUSCULAR at 15:42

## 2020-04-10 ASSESSMENT — PAIN SCALES - GENERAL: PAINLEVEL_OUTOF10: 0

## 2020-04-10 NOTE — ED PROVIDER NOTES
COURSE/MEDICAL DECISION MAKING----------------------  Medications   Tetanus-Diphth-Acell Pertussis (BOOSTRIX) injection 0.5 mL (0.5 mLs Intramuscular Given 4/10/20 1542)         ED COURSE:  ED Course as of Apr 10 1611   Fri Apr 10, 2020   1600 Spoke with Dr. Sol Santiago with orthopedic surgery. Discussed the case with him. He states that the patient should be placed on Keflex and have a tetanus update and he will see him in the office next week for further evaluation management. Discussed this with the patient and his daughter and they are agreeable with this plan. Patient is in no distress, his tetanus status has been updated, and he is stable for discharge.    [BM]      ED Course User Index  [BM] Jenny , DO       Medical Decision Making:    Patient presents after he suffered a metallic foreign body on the medial aspect of his left knee. He has no other injuries or trauma. His vital signs are stable and he is in no distress. He does have a small hematoma along the medial aspect of the left knee with a small puncture wound which is Aaron scabbed over there is no active bleeding. No signs of infection. Patient is able to stand, bear weight, and walk without issue. He will require an updated tetanus shot. Patient Aaron had x-rays obtained at urgent care which do show a retained metallic foreign body. Orthopedic surgery will be consulted to discuss further management and disposition. Counseling: The emergency provider has spoken with the patient and discussed todays results, in addition to providing specific details for the plan of care and counseling regarding the diagnosis and prognosis. Questions are answered at this time and they are agreeable with the plan.      --------------------------------- IMPRESSION AND DISPOSITION ---------------------------------    IMPRESSION  1.  Foreign body of skin of knee, left, initial encounter        DISPOSITION  Disposition: Discharge to home  Patient

## 2020-04-10 NOTE — ED PROVIDER NOTES
Department of Emergency Medicine   ED  Provider Note  Admit Date/RoomTime: 4/10/2020 12:42 PM  ED Room: 06/06  MRN: [id number]  Chief Complaint: Foreign Body (happened yesterday while chopping wood got piece of metal in  left knee)       History of Present Illness   Source of history provided by:  patient. History/Exam Limitations: none. Becky Jurado is a 67 y.o. male who has a past medical history of:   Past Medical History:   Diagnosis Date    Aneurysm of aorta ([facility]     CAD (coronary artery disease)     HLD (hyperlipidemia)     Hypertension     Neck pain     PAD (peripheral artery disease) ([facility]     Pulmonary emboli (HCC)     TIA (transient ischemic attack)     UTI (lower urinary tract infection)     presents to the urgent care for a foreign body in the left knee. ROS    Pertinent positives and negatives are stated within HPI, all other systems reviewed and are negative. Past Surgical History:   Procedure Laterality Date    CARDIAC SURGERY      CORONARY ARTERY BYPASS GRAFT      CRANIOTOMY Right 2/9/2020    CRANIOTOMY BRIANNA HOLES performed by Eliana Vieira MD at [address] [place] [address]     Social History:  reports that he quit smoking about 51 years ago. He has a 20.00 pack-year smoking history. He has never used smokeless tobacco. He reports that he does not drink alcohol or use drugs. Family History: family history is not on file. Allergies: Patient has no known allergies. Physical Exam   Oxygen Saturation Interpretation: Normal.   ED Triage Vitals [04/10/20 1244]   BP Temp Temp Source Pulse Resp SpO2 Height Weight   (!) 175/74 98.2 °F (36.8 °C) Oral 56 20 99 % -- 182 lb (82.6 kg)       Physical Exam  · Constitutional/General: Alert and oriented x3, well appearing, non toxic  · HEENT:  NC/NT. PERRLA,  Airway patent. · Neck: Supple, full ROM  · Respiratory: Nonlabored breathing  · CV: Bradycardia  · Musculoskeletal: Moves all extremities x 4.  Warm and well perfused, no CNP  04/10/20 1931

## 2020-04-14 ENCOUNTER — OFFICE VISIT (OUTPATIENT)
Dept: ORTHOPEDIC SURGERY | Age: 80
End: 2020-04-14
Payer: MEDICARE

## 2020-04-14 VITALS — TEMPERATURE: 98 F | HEIGHT: 72 IN | WEIGHT: 180 LBS | BODY MASS INDEX: 24.38 KG/M2

## 2020-04-14 PROCEDURE — 99203 OFFICE O/P NEW LOW 30 MIN: CPT | Performed by: ORTHOPAEDIC SURGERY

## 2020-04-14 RX ORDER — CEPHALEXIN 500 MG/1
500 CAPSULE ORAL 3 TIMES DAILY
Qty: 21 CAPSULE | Refills: 0 | Status: SHIPPED | OUTPATIENT
Start: 2020-04-14 | End: 2020-04-21

## 2020-04-14 NOTE — PROGRESS NOTES
Chief Complaint   Patient presents with    Knee Pain     Left Knee, states of foreign body in knee. DOI 4/9/2020, went to [facility]       Subjective:     Patient ID: James Dumont is a 67 y.o..  male    Knee Pain  Patient complains of left knee pain. This is evaluated as a personal injury. There was a history of injury. Was cutting word and metal foreign object   The pain began 5 days ago. The pain is located medial. He describes  His symptoms as sharp. He has not experienced popping, clicking, locking, and giving way in the affected knee. The patient has not had pain with kneeling, squating, and climbing stairs. He went to ED, was placed on antibiotics.     Past Medical History:   Diagnosis Date    Aneurysm of aorta ([address]     CAD (coronary artery disease)     HLD (hyperlipidemia)     Hypertension     Neck pain     PAD (peripheral artery disease) (HCC)     Pulmonary emboli (HCC)     TIA (transient ischemic attack)     UTI (lower urinary tract infection)      Past Surgical History:   Procedure Laterality Date    CARDIAC SURGERY      CORONARY ARTERY BYPASS GRAFT      CRANIOTOMY Right 2/9/2020    CRANIOTOMY BRIANNA HOLES performed by Donna Contreras MD at [facility] 22 TONSILLECTOMY         Current Outpatient Medications:     cephALEXin (KEFLEX) 500 MG capsule, Take 1 capsule by mouth 3 times daily for 7 days, Disp: 21 capsule, Rfl: 0    aspirin 81 MG tablet, Take 81 mg by mouth daily, Disp: , Rfl:     Cholecalciferol (VITAMIN D3) 125 MCG (5000 UT) TABS, Take by mouth, Disp: , Rfl:     rosuvastatin (CRESTOR) 20 MG tablet, Take 1 tablet by mouth daily, Disp: 90 tablet, Rfl: 1    glucose monitoring kit (FREESTYLE) monitoring kit, 1 kit by Does not apply route 2 times daily, Disp: 1 kit, Rfl: 0    blood glucose monitor strips, Test 2 times a day & as needed for symptoms of irregular blood glucose., Disp: 100 strip, Rfl: 2    Lancets MISC, 1 each by Does not apply route 2 times daily, Disp: 100 each, Rfl: 5    metoprolol succinate (TOPROL XL) 25 MG extended release tablet, Take 12.5 mg by mouth daily, Disp: , Rfl:     Ascorbic Acid (VITAMIN C) 500 MG tablet, Take 500 mg by mouth daily. , Disp: , Rfl:     levETIRAcetam (KEPPRA) 500 MG tablet, Take 1 tablet by mouth 2 times daily for 14 doses, Disp: 14 tablet, Rfl: 0  No Known Allergies  Social History     Socioeconomic History    Marital status:      Spouse name: Not on file    Number of children: Not on file    Years of education: Not on file    Highest education level: Not on file   Occupational History    Not on file   Social Needs    Financial resource strain: Not on file    Food insecurity     Worry: Not on file     Inability: Not on file    Transportation needs     Medical: Not on file     Non-medical: Not on file   Tobacco Use    Smoking status: Former Smoker     Packs/day: 1.00     Years: 20.00     Pack years: 20.00     Last attempt to quit: 3/26/1969     Years since quittin.0    Smokeless tobacco: Never Used   Substance and Sexual Activity    Alcohol use: No    Drug use: No    Sexual activity: Never   Lifestyle    Physical activity     Days per week: Not on file     Minutes per session: Not on file    Stress: Not on file   Relationships    Social connections     Talks on phone: Not on file     Gets together: Not on file     Attends Yazidism service: Not on file     Active member of club or organization: Not on file     Attends meetings of clubs or organizations: Not on file     Relationship status: Not on file    Intimate partner violence     Fear of current or ex partner: Not on file     Emotionally abused: Not on file     Physically abused: Not on file     Forced sexual activity: Not on file   Other Topics Concern    Not on file   Social History Narrative    Not on file     No family history on file. REVIEW OF SYSTEMS:     General/Constitution:  (-)weight loss, (-)fever, (-)chills, (-)weakness.    Skin: (-) rash,(-) psoriasis,(-) eczema, (-)skin cancer. Musculoskeletal: (-) fractures,  (-) dislocations,(-) collagen vascular disease, (-) fibromyalgia, (-) multiple sclerosis, (-) muscular dystrophy, (-) RSD,(-) joint pain (-)swelling, (-) joint pain,swelling. Neurologic: (-) epilepsy, (-)seizures,(-) brain tumor,(-) TIA, (-)stroke, (-)headaches, (-)Parkinson disease,(-) memory loss, (-) LOC. Cardiovascular: (-) Chest pain, (-) swelling in legs/feet, (-) SOB, (-) cramping in legs/feet with walking. Respiratory: (-) SOB, (-) Coughing, (-) night sweats. GI: (-) nausea, (-) vomiting, (-) diarrhea, (-) blood in stool, (-) gastric ulcer. Psychiatric: (-) Depression, (-) Anxiety, (-) bipolar disease, (-) Alzheimer's Disease  Allergic/Immunologic: (-) allergies latex, (-) allergies metal, (-) skin sensitivity. Hematlogic: (-) anemia, (-) blood transfusion, (-) DVT/PE, (-) Clotting disorders    Subjective:    Constitution:  Temp 98 °F (36.7 °C)   Ht 6' (1.829 m)   Wt 180 lb (81.6 kg)   BMI 24.41 kg/m²     Psycihatric:  The patient is alert and oriented x 3, appears to be stated age and in no distress. Respiratory:  Respiratory effort is not labored. Patient is not gasping. Palpation of the chest reveals no tactile fremitus. Skin:  Upon inspection: the skin appears warm, dry and intact. There is  a previous scar over the affected area. There is any cellulitis, lymphedema or cutaneous lesions noted in the lower extremities. Upon palpation there is no induration noted. Neurologic:  Gait: antalgic; Motor exam of the lower extremities show ; quadriceps, hamstrings, foot dorsi and plantar flexors intact R.  5/5 and L. 5/5. Deep tendon reflexes are 2/4 at the knees and 2/4 at the ankles with strong extensor hallicus longus motor strength bilaterally. Sensory to both feet is intact to all sensory roots. Cardiovascular: The vascular exam is normal and is well perfused to distal extremities.   Distal pulses patient    Assessment:  Encounter Diagnoses   Name Primary?  Foreign body (FB) in soft tissue Yes       Plan:  Natural history and expected course discussed. Questions answered. Educational materials distributed. Rest, ice, compression, and elevation (RICE) therapy. Reduction in offending activity.   Foreign object removed from medial knee, no sutures needed  Keflex for one week  Fu in 1 week virtual visit

## 2020-05-11 ENCOUNTER — HOSPITAL ENCOUNTER (OUTPATIENT)
Dept: CT IMAGING | Age: 80
Discharge: HOME | End: 2020-05-11
Payer: MEDICARE

## 2020-05-11 PROCEDURE — 70450 CT HEAD/BRAIN W/O DYE: CPT

## 2020-05-12 ENCOUNTER — OFFICE VISIT (OUTPATIENT)
Dept: NEUROSURGERY | Age: 80
End: 2020-05-12

## 2020-05-12 VITALS
DIASTOLIC BLOOD PRESSURE: 74 MMHG | WEIGHT: 180 LBS | HEART RATE: 63 BPM | HEIGHT: 72 IN | TEMPERATURE: 98.4 F | SYSTOLIC BLOOD PRESSURE: 151 MMHG | BODY MASS INDEX: 24.38 KG/M2

## 2020-05-12 PROCEDURE — 99024 POSTOP FOLLOW-UP VISIT: CPT | Performed by: PHYSICIAN ASSISTANT

## 2020-11-06 DIAGNOSIS — Z12.5 SPECIAL SCREENING FOR MALIGNANT NEOPLASM OF PROSTATE: ICD-10-CM

## 2020-11-06 DIAGNOSIS — E55.9 VITAMIN D DEFICIENCY: ICD-10-CM

## 2020-11-06 DIAGNOSIS — R73.9 HYPERGLYCEMIA: ICD-10-CM

## 2020-11-06 DIAGNOSIS — D50.0 IRON DEFICIENCY ANEMIA SECONDARY TO BLOOD LOSS (CHRONIC): ICD-10-CM

## 2020-11-06 DIAGNOSIS — E78.01 FAMILIAL HYPERCHOLESTEROLEMIA: ICD-10-CM

## 2020-11-06 DIAGNOSIS — E03.9 PRIMARY HYPOTHYROIDISM: ICD-10-CM

## 2020-11-06 LAB
ALBUMIN SERPL-MCNC: 4.6 G/DL (ref 3.5–5.2)
ALP BLD-CCNC: 29 U/L (ref 40–129)
ALT SERPL-CCNC: 14 U/L (ref 0–40)
ANION GAP SERPL CALCULATED.3IONS-SCNC: 17 MMOL/L (ref 7–16)
AST SERPL-CCNC: 21 U/L (ref 0–39)
BILIRUB SERPL-MCNC: 0.6 MG/DL (ref 0–1.2)
BUN BLDV-MCNC: 13 MG/DL (ref 8–23)
CALCIUM SERPL-MCNC: 9.7 MG/DL (ref 8.6–10.2)
CHLORIDE BLD-SCNC: 105 MMOL/L (ref 98–107)
CHOLESTEROL, TOTAL: 112 MG/DL (ref 0–199)
CO2: 22 MMOL/L (ref 22–29)
CREAT SERPL-MCNC: 1 MG/DL (ref 0.7–1.2)
GFR AFRICAN AMERICAN: >60
GFR NON-AFRICAN AMERICAN: >60 ML/MIN/1.73
GLUCOSE BLD-MCNC: 108 MG/DL (ref 74–99)
HBA1C MFR BLD: 6.4 % (ref 4–5.6)
HCT VFR BLD CALC: 45 % (ref 37–54)
HDLC SERPL-MCNC: 44 MG/DL
HEMOGLOBIN: 14.7 G/DL (ref 12.5–16.5)
LDL CHOLESTEROL CALCULATED: 44 MG/DL (ref 0–99)
MCH RBC QN AUTO: 31.1 PG (ref 26–35)
MCHC RBC AUTO-ENTMCNC: 32.7 % (ref 32–34.5)
MCV RBC AUTO: 95.1 FL (ref 80–99.9)
PDW BLD-RTO: 12.8 FL (ref 11.5–15)
PLATELET # BLD: 155 E9/L (ref 130–450)
PMV BLD AUTO: 11.6 FL (ref 7–12)
POTASSIUM SERPL-SCNC: 5.4 MMOL/L (ref 3.5–5)
PROSTATE SPECIFIC ANTIGEN: 2.28 NG/ML (ref 0–4)
RBC # BLD: 4.73 E12/L (ref 3.8–5.8)
SODIUM BLD-SCNC: 144 MMOL/L (ref 132–146)
TOTAL PROTEIN: 7.1 G/DL (ref 6.4–8.3)
TRIGL SERPL-MCNC: 121 MG/DL (ref 0–149)
TSH SERPL DL<=0.05 MIU/L-ACNC: 2.41 UIU/ML (ref 0.27–4.2)
VITAMIN D 25-HYDROXY: 53 NG/ML (ref 30–100)
VLDLC SERPL CALC-MCNC: 24 MG/DL
WBC # BLD: 6.7 E9/L (ref 4.5–11.5)

## 2021-03-06 ENCOUNTER — IMMUNIZATION (OUTPATIENT)
Dept: PRIMARY CARE CLINIC | Age: 81
End: 2021-03-06
Payer: MEDICARE

## 2021-03-06 PROCEDURE — 0001A COVID-19, PFIZER VACCINE 30MCG/0.3ML DOSE: CPT | Performed by: PHYSICIAN ASSISTANT

## 2021-03-06 PROCEDURE — 91300 COVID-19, PFIZER VACCINE 30MCG/0.3ML DOSE: CPT | Performed by: PHYSICIAN ASSISTANT

## 2021-04-05 ENCOUNTER — IMMUNIZATION (OUTPATIENT)
Dept: PRIMARY CARE CLINIC | Age: 81
End: 2021-04-05
Payer: MEDICARE

## 2021-04-05 PROCEDURE — 91300 COVID-19, PFIZER VACCINE 30MCG/0.3ML DOSE: CPT | Performed by: NURSE PRACTITIONER

## 2021-04-05 PROCEDURE — 0002A COVID-19, PFIZER VACCINE 30MCG/0.3ML DOSE: CPT | Performed by: NURSE PRACTITIONER

## 2021-05-06 DIAGNOSIS — E78.01 FAMILIAL HYPERCHOLESTEROLEMIA: ICD-10-CM

## 2021-05-06 DIAGNOSIS — E11.9 TYPE 2 DIABETES MELLITUS WITHOUT COMPLICATION, WITHOUT LONG-TERM CURRENT USE OF INSULIN (HCC): ICD-10-CM

## 2021-05-06 LAB
ALBUMIN SERPL-MCNC: 4.4 G/DL (ref 3.5–5.2)
ALP BLD-CCNC: 28 U/L (ref 40–129)
ALT SERPL-CCNC: 15 U/L (ref 0–40)
ANION GAP SERPL CALCULATED.3IONS-SCNC: 10 MMOL/L (ref 7–16)
AST SERPL-CCNC: 21 U/L (ref 0–39)
BILIRUB SERPL-MCNC: 0.5 MG/DL (ref 0–1.2)
BUN BLDV-MCNC: 11 MG/DL (ref 6–23)
CALCIUM SERPL-MCNC: 9.1 MG/DL (ref 8.6–10.2)
CHLORIDE BLD-SCNC: 106 MMOL/L (ref 98–107)
CHOLESTEROL, TOTAL: 117 MG/DL (ref 0–199)
CO2: 27 MMOL/L (ref 22–29)
CREAT SERPL-MCNC: 1 MG/DL (ref 0.7–1.2)
GFR AFRICAN AMERICAN: >60
GFR NON-AFRICAN AMERICAN: >60 ML/MIN/1.73
GLUCOSE BLD-MCNC: 131 MG/DL (ref 74–99)
HBA1C MFR BLD: 6.8 % (ref 4–5.6)
HDLC SERPL-MCNC: 38 MG/DL
LDL CHOLESTEROL CALCULATED: 52 MG/DL (ref 0–99)
POTASSIUM SERPL-SCNC: 4.7 MMOL/L (ref 3.5–5)
SODIUM BLD-SCNC: 143 MMOL/L (ref 132–146)
TOTAL PROTEIN: 6.9 G/DL (ref 6.4–8.3)
TRIGL SERPL-MCNC: 133 MG/DL (ref 0–149)
VLDLC SERPL CALC-MCNC: 27 MG/DL

## 2021-11-05 ENCOUNTER — HOSPITAL ENCOUNTER (OUTPATIENT)
Dept: CARDIAC REHAB | Age: 81
Setting detail: THERAPIES SERIES
Discharge: HOME | End: 2021-11-05
Payer: MEDICARE

## 2021-11-05 VITALS
HEIGHT: 72 IN | BODY MASS INDEX: 25.47 KG/M2 | RESPIRATION RATE: 18 BRPM | DIASTOLIC BLOOD PRESSURE: 68 MMHG | WEIGHT: 188 LBS | OXYGEN SATURATION: 100 % | HEART RATE: 61 BPM | SYSTOLIC BLOOD PRESSURE: 128 MMHG

## 2021-11-05 RX ORDER — ASPIRIN 81 MG/1
81 TABLET ORAL DAILY
COMMUNITY

## 2021-11-05 ASSESSMENT — PATIENT HEALTH QUESTIONNAIRE - PHQ9
SUM OF ALL RESPONSES TO PHQ QUESTIONS 1-9: 0
SUM OF ALL RESPONSES TO PHQ QUESTIONS 1-9: 0

## 2021-11-08 ENCOUNTER — HOSPITAL ENCOUNTER (OUTPATIENT)
Dept: CARDIAC REHAB | Age: 81
Setting detail: THERAPIES SERIES
Discharge: HOME | End: 2021-11-08
Payer: MEDICARE

## 2021-11-08 PROCEDURE — 93798 PHYS/QHP OP CAR RHAB W/ECG: CPT

## 2021-11-10 ENCOUNTER — HOSPITAL ENCOUNTER (OUTPATIENT)
Dept: CARDIAC REHAB | Age: 81
Setting detail: THERAPIES SERIES
Discharge: HOME | End: 2021-11-10
Payer: MEDICARE

## 2021-11-10 PROCEDURE — 93798 PHYS/QHP OP CAR RHAB W/ECG: CPT

## 2021-11-11 DIAGNOSIS — E03.9 PRIMARY HYPOTHYROIDISM: ICD-10-CM

## 2021-11-11 DIAGNOSIS — Z12.5 SPECIAL SCREENING FOR MALIGNANT NEOPLASM OF PROSTATE: ICD-10-CM

## 2021-11-11 DIAGNOSIS — E11.9 TYPE 2 DIABETES MELLITUS WITHOUT COMPLICATION, WITHOUT LONG-TERM CURRENT USE OF INSULIN (HCC): ICD-10-CM

## 2021-11-11 DIAGNOSIS — E55.9 VITAMIN D DEFICIENCY: ICD-10-CM

## 2021-11-11 DIAGNOSIS — E78.01 FAMILIAL HYPERCHOLESTEROLEMIA: ICD-10-CM

## 2021-11-11 LAB
CHOLESTEROL, TOTAL: 122 MG/DL (ref 0–199)
HBA1C MFR BLD: 6.1 % (ref 4–5.6)
HDLC SERPL-MCNC: 42 MG/DL
LDL CHOLESTEROL CALCULATED: 49 MG/DL (ref 0–99)
PROSTATE SPECIFIC ANTIGEN: 3.57 NG/ML (ref 0–4)
TRIGL SERPL-MCNC: 157 MG/DL (ref 0–149)
TSH SERPL DL<=0.05 MIU/L-ACNC: 4.27 UIU/ML (ref 0.27–4.2)
VITAMIN D 25-HYDROXY: 54 NG/ML (ref 30–100)
VLDLC SERPL CALC-MCNC: 31 MG/DL

## 2021-11-12 ENCOUNTER — HOSPITAL ENCOUNTER (OUTPATIENT)
Dept: CARDIAC REHAB | Age: 81
Setting detail: THERAPIES SERIES
Discharge: HOME | End: 2021-11-12
Payer: MEDICARE

## 2021-11-12 PROCEDURE — 93798 PHYS/QHP OP CAR RHAB W/ECG: CPT

## 2021-11-15 ENCOUNTER — APPOINTMENT (OUTPATIENT)
Dept: CARDIAC REHAB | Age: 81
End: 2021-11-15
Payer: MEDICARE

## 2021-11-17 ENCOUNTER — APPOINTMENT (OUTPATIENT)
Dept: CARDIAC REHAB | Age: 81
End: 2021-11-17
Payer: MEDICARE

## 2021-11-19 ENCOUNTER — APPOINTMENT (OUTPATIENT)
Dept: CARDIAC REHAB | Age: 81
End: 2021-11-19
Payer: MEDICARE

## 2021-11-22 ENCOUNTER — APPOINTMENT (OUTPATIENT)
Dept: CARDIAC REHAB | Age: 81
End: 2021-11-22
Payer: MEDICARE

## 2021-11-24 ENCOUNTER — APPOINTMENT (OUTPATIENT)
Dept: CARDIAC REHAB | Age: 81
End: 2021-11-24
Payer: MEDICARE

## 2021-11-26 ENCOUNTER — APPOINTMENT (OUTPATIENT)
Dept: CARDIAC REHAB | Age: 81
End: 2021-11-26
Payer: MEDICARE

## 2021-11-29 ENCOUNTER — APPOINTMENT (OUTPATIENT)
Dept: CARDIAC REHAB | Age: 81
End: 2021-11-29
Payer: MEDICARE

## 2021-12-01 ENCOUNTER — HOSPITAL ENCOUNTER (OUTPATIENT)
Dept: CARDIAC REHAB | Age: 81
Setting detail: THERAPIES SERIES
End: 2021-12-01
Payer: MEDICARE

## 2021-12-03 ENCOUNTER — HOSPITAL ENCOUNTER (OUTPATIENT)
Dept: CARDIAC REHAB | Age: 81
Setting detail: THERAPIES SERIES
Discharge: HOME | End: 2021-12-03
Payer: MEDICARE

## 2021-12-03 PROCEDURE — 93798 PHYS/QHP OP CAR RHAB W/ECG: CPT

## 2021-12-03 ASSESSMENT — PATIENT HEALTH QUESTIONNAIRE - PHQ9
SUM OF ALL RESPONSES TO PHQ QUESTIONS 1-9: 0
SUM OF ALL RESPONSES TO PHQ QUESTIONS 1-9: 0

## 2021-12-06 ENCOUNTER — HOSPITAL ENCOUNTER (OUTPATIENT)
Dept: CARDIAC REHAB | Age: 81
Setting detail: THERAPIES SERIES
Discharge: HOME | End: 2021-12-06
Payer: MEDICARE

## 2021-12-06 PROCEDURE — 93798 PHYS/QHP OP CAR RHAB W/ECG: CPT

## 2021-12-08 ENCOUNTER — HOSPITAL ENCOUNTER (OUTPATIENT)
Dept: CARDIAC REHAB | Age: 81
Setting detail: THERAPIES SERIES
Discharge: HOME | End: 2021-12-08
Payer: MEDICARE

## 2021-12-08 PROCEDURE — 93798 PHYS/QHP OP CAR RHAB W/ECG: CPT

## 2021-12-10 ENCOUNTER — HOSPITAL ENCOUNTER (OUTPATIENT)
Dept: CARDIAC REHAB | Age: 81
Setting detail: THERAPIES SERIES
End: 2021-12-10
Payer: MEDICARE

## 2021-12-13 ENCOUNTER — HOSPITAL ENCOUNTER (OUTPATIENT)
Dept: CARDIAC REHAB | Age: 81
Setting detail: THERAPIES SERIES
Discharge: HOME | End: 2021-12-13
Payer: MEDICARE

## 2021-12-13 ENCOUNTER — HOSPITAL ENCOUNTER (OUTPATIENT)
Dept: CARDIAC REHAB | Age: 81
Setting detail: THERAPIES SERIES
End: 2021-12-13
Payer: MEDICARE

## 2021-12-13 ENCOUNTER — TELEPHONE (OUTPATIENT)
Dept: CARDIAC REHAB | Age: 81
End: 2021-12-13

## 2021-12-13 NOTE — TELEPHONE ENCOUNTER
Patient presented to cardiac rehab with a BP of 164/80. Advised patient to rest. Patient took all BP medications over the weekend and today. O2 was at 97% and HR was at 51 during rest. After 5 minutes, BP was still in the 160s/78. Patient denied any SOB and chest pain. He had some slight dizziness. Patient will not be exercising today. Took one last BP before leaving (168/78). Advised patient to call cardiologist, to keep him aware of high BP and to keep track of BP's at home.

## 2021-12-15 ENCOUNTER — HOSPITAL ENCOUNTER (OUTPATIENT)
Dept: CARDIAC REHAB | Age: 81
Setting detail: THERAPIES SERIES
End: 2021-12-15
Payer: MEDICARE

## 2021-12-17 ENCOUNTER — HOSPITAL ENCOUNTER (OUTPATIENT)
Dept: CARDIAC REHAB | Age: 81
Setting detail: THERAPIES SERIES
End: 2021-12-17
Payer: MEDICARE

## 2021-12-20 ENCOUNTER — HOSPITAL ENCOUNTER (OUTPATIENT)
Dept: CARDIAC REHAB | Age: 81
Setting detail: THERAPIES SERIES
Discharge: HOME | End: 2021-12-20
Payer: MEDICARE

## 2021-12-20 PROCEDURE — 93798 PHYS/QHP OP CAR RHAB W/ECG: CPT

## 2021-12-21 ENCOUNTER — TELEPHONE (OUTPATIENT)
Dept: CARDIAC REHAB | Age: 81
End: 2021-12-21

## 2021-12-21 NOTE — TELEPHONE ENCOUNTER
12/21/2021 12:42 pm Nutrition: Spoke with wife, Kenia Pedersen, on this date by phone, confirming appointment for 12/22/2021 at 9:00 am. Will remain available.  Electronically signed by Ken Joiner RD, LD on 12/21/21 at 12:42 PM EST

## 2021-12-22 ENCOUNTER — HOSPITAL ENCOUNTER (OUTPATIENT)
Dept: CARDIAC REHAB | Age: 81
Setting detail: THERAPIES SERIES
Discharge: HOME | End: 2021-12-22
Payer: MEDICARE

## 2021-12-22 VITALS — BODY MASS INDEX: 25.42 KG/M2 | WEIGHT: 187.7 LBS | HEIGHT: 72 IN

## 2021-12-22 PROCEDURE — 93798 PHYS/QHP OP CAR RHAB W/ECG: CPT

## 2021-12-22 PROCEDURE — 93797 PHYS/QHP OP CAR RHAB WO ECG: CPT

## 2021-12-22 NOTE — PROGRESS NOTES
Cardiac Rehab Nutrition Assessment    NAME: Annie Pa : 1940 AGE: [de-identified] y.o. GENDER: male    CARDIAC REHAB ADMITTING DIAGNOSIS: s/p Heart Cath with stent x 1 and s/p TAVR     PROBLEM LIST:    Patient Active Problem List   Diagnosis    Leukocytosis    [facility]    HLD (hyperlipidemia)    Type 2 diabetes mellitus without complication, without long-term current use of insulin (HCC)    Traumatic subarachnoid hemorrhage (HCC)    SDH (subdural hematoma) (HCC)    SAH [facility] hemorrhage) ([facility]    Coronary artery disease    Seizure after [facility])     Additional Pertinent Past Medical/Surgical History: Aortic Valve Stenosis, ( remote ileus and mild malnutrition- resolved),remote CABG x 3     LABS:    2021: HgA1c: 6.4 (high)     Lab Results   Component Value Date    CHOL 122 2021    HDL 42 2021    LDLCALC 49 2021    TRIG 157 2021    LABVLDL 31 2021     MEDICATIONS/SUPPLEMENTS:    Scheduled Meds:see list below   Continuous Infusions:not applicable   PRN Meds:.not applicable     Prior to Admission medications    Medication Sig Start Date End Date Taking? Authorizing Provider   pantoprazole (PROTONIX) 40 MG tablet Take 40 mg by mouth every morning (before breakfast) 21   Historical Provider, MD   aspirin 81 MG EC tablet Take 81 mg by mouth daily    Historical Provider, MD   rosuvastatin (CRESTOR) 20 MG tablet Take 1 tablet by mouth nightly 10/8/21   Yamilet Gutierrez DO   clopidogrel (PLAVIX) 75 MG tablet Take 1 tablet by mouth daily 4/3/21   Historical Provider, MD   Cholecalciferol (VITAMIN D3) 125 MCG (5000 UT) TABS Take by mouth    Historical Provider, MD   metoprolol succinate (TOPROL XL) 25 MG extended release tablet Take 12.5 mg by mouth daily    Historical Provider, MD   Ascorbic Acid (VITAMIN C) 500 MG tablet Take 500 mg by mouth daily.     Historical Provider, MD     ANTHROPOMETRICS:    Ht Readings from Last 1 Encounters:   12/22/21 6' (1.829 m)      Wt Readings from Last 10 Encounters:   12/22/21 187 lb 11.2 oz (85.1 kg)   11/12/21 189 lb 8 oz (86 kg)   11/05/21 188 lb (85.3 kg)   10/08/21 182 lb 4.8 oz (82.7 kg)   05/06/21 199 lb 8 oz (90.5 kg)   11/06/20 192 lb 14.4 oz (87.5 kg)   05/12/20 180 lb (81.6 kg)   04/14/20 180 lb (81.6 kg)   04/10/20 184 lb (83.5 kg)   04/10/20 182 lb (82.6 kg)                 IBW:178 lbs  +/- 10%       %IBW: 105% +/- 10%                BMI: 25.5 9 (Overweight)     Waist: deferred ( plan to measure at next available time)      Reported Wt Hx:Patient and wife report usual weight prior to cardiac event: 189 to 190 pounds. Patient lowest weight during hospitalization 175 pounds. Patient has regained weight. Weight gain beneficial. Cardiac Rehab Admit weight: 188 pounds. Weight today:  187 pounds. Patient lost one pound over one month. No significant weight change. Reported Diet Hx:Patient is eating 3 meals and 1 to 2 snacks; has good appetite. Rate Your Plate Score:deferred    (Score 58-72: Making many healthy choices; 41-57: Some choices need improving 24-40: many choices need improving)    24 HOUR DIET RECALL    Breakfast: time varies ( 8 to 10 am) at home,  garlic egg salad sandwich , 2 pieces wheat toast with butter and 1 cup coffee   2 cups regular coffee with 2% milk and 1 spoon brown sugar, 4 ounces glass prune juice every other day ( alternate: has 1/2 banana daily and after exercise oatmeal with blueberries)      Lunch: time varies,   At home, 1 commercial  small slice pizza with barbeque sauce with cheese, 1 1/4 cup  homemade potato salad with 4 ounces Activia yogurt, 1 cup regular coffee with 2% milk and 1 spoon brown  sugar     Dinner: 5:30 pm, at home,appetizer- 1 cup regular coffee with 2% milk and brown sugar,  1 cup homemade grown mashed potatoes with skins on with chicken gravy, carrots, celery, onions, 1 cup homemade potato salad, corn.  1 cup water with pills Snacks:time varies,  1  heaping  spoon peanut butter (alternate:  1 leftover olivia cracker with icing from cookie, potato chips, air popped popcorn)    Beverages: drinks 4 to 5 - 16 ounces glasses water and 2  cups regular coffee; does not drink pop. Elo Herman     Environmental/Social:Patient is  On regular diet; no alcohol intake; no food allergies; has good appetite; snacks frequently; no cultural, no Protestant or ethnic food preferences; no chewing or swallowing problems; edentulous - has dentures but does not wear them; eats regular consistent diet; has adequate funds for food; does not eat a sit down restaurant due to pandemic; once and while eats fast food; hunts and fishes and has garden; has adequate funds for food;        NUTRITION INTERVENTION:    Nutrition 30 / 60 minute one-on-one education & goal setting with Elo Herman     Reviewed with Elo Herman relevant labs compared to ideals. Reviewed weight history and patient's verbalized weight goal as well as any real or perceived barriers to obtaining the goal. Collaborated with patient to set a specific short and long term weight goal.     Conducted a verbal diet recall. Assessed for environmental, financial, psychosocial, physical and comorbidities that may impact the food and eating patterns. Collaborated with patient to set specific nutrient goals as well as specific food / behavior changes that will help patient meet the overall goal of following a heart healthy eating pattern (using guidelines as set forth by the American Heart Association and modeled after healthful eating patterns as recognized by the USDA Dietary Guidelines such as DASH, Mediterranean or plant-based).      Briefly reviewed with Elo Herman and wife Nutrition information:  My Plate, Label Reading, Dietary Resolutions, Menu Planning, Serving Size and MNT Heart Healthy Consistent Carbohydrate information and encouraged Elo Herman and wife to read thoroughly, ask questions as needed, and use for future reference for heart healthy nutrition information. Amy Valerio  is not scheduled to participate in Cardiac Rehab group nutrition classes. PATIENT GOALS:    Weight Goals:Patient will maintain current weight within one -two pounds. Short Term Weight Goal:187 lbs    Long Term Weight Goal:185 lbs       Nutrition Goals:Patient will follow My Plate guidelines. Daily Recommendations:Patient will eat at least 3 meals and one to two snacks daily. Calories: 2000 calories/day    (using [place] (8461)with AF 1.3)      Saturated Fat: no more than  15  g/day    Trans Fat: 0 g/day    Sodium: no more than 2000 mg/day    Fruit: 3  cups / day    Vegetables: 2 cups/day     Other:    - read and compare food labels  _  -Keeping a food diary was recommended. Patient is a phase II participant and ITP done. Questions addressed. Follow-up plans discussed. Patient will be followed up per pt, PRN. Patient and wife informed of contact information. Amy Valerio and wife verbalized understanding. Serina Simmons MA, RDN, LD  Contact  Phone [phone].

## 2021-12-27 ENCOUNTER — HOSPITAL ENCOUNTER (OUTPATIENT)
Dept: CARDIAC REHAB | Age: 81
Setting detail: THERAPIES SERIES
Discharge: HOME | End: 2021-12-27
Payer: MEDICARE

## 2021-12-27 PROCEDURE — 93798 PHYS/QHP OP CAR RHAB W/ECG: CPT

## 2021-12-29 ENCOUNTER — HOSPITAL ENCOUNTER (OUTPATIENT)
Dept: CARDIAC REHAB | Age: 81
Setting detail: THERAPIES SERIES
Discharge: HOME | End: 2021-12-29
Payer: MEDICARE

## 2021-12-29 PROCEDURE — 93798 PHYS/QHP OP CAR RHAB W/ECG: CPT

## 2022-05-06 DIAGNOSIS — E78.01 FAMILIAL HYPERCHOLESTEROLEMIA: ICD-10-CM

## 2022-05-06 DIAGNOSIS — R73.9 HYPERGLYCEMIA: ICD-10-CM

## 2022-05-06 LAB
ALBUMIN SERPL-MCNC: 4.7 G/DL (ref 3.5–5.2)
ALP BLD-CCNC: 36 U/L (ref 40–129)
ALT SERPL-CCNC: 15 U/L (ref 0–40)
ANION GAP SERPL CALCULATED.3IONS-SCNC: 13 MMOL/L (ref 7–16)
AST SERPL-CCNC: 20 U/L (ref 0–39)
BILIRUB SERPL-MCNC: 0.4 MG/DL (ref 0–1.2)
BUN BLDV-MCNC: 11 MG/DL (ref 6–23)
CALCIUM SERPL-MCNC: 9.1 MG/DL (ref 8.6–10.2)
CHLORIDE BLD-SCNC: 102 MMOL/L (ref 98–107)
CHOLESTEROL, TOTAL: 125 MG/DL (ref 0–199)
CO2: 25 MMOL/L (ref 22–29)
CREAT SERPL-MCNC: 0.8 MG/DL (ref 0.7–1.2)
GFR AFRICAN AMERICAN: >60
GFR NON-AFRICAN AMERICAN: >60 ML/MIN/1.73
GLUCOSE BLD-MCNC: 127 MG/DL (ref 74–99)
HBA1C MFR BLD: 7.4 % (ref 4–5.6)
HDLC SERPL-MCNC: 41 MG/DL
LDL CHOLESTEROL CALCULATED: 53 MG/DL (ref 0–99)
POTASSIUM SERPL-SCNC: 4.3 MMOL/L (ref 3.5–5)
SODIUM BLD-SCNC: 140 MMOL/L (ref 132–146)
TOTAL PROTEIN: 7.5 G/DL (ref 6.4–8.3)
TRIGL SERPL-MCNC: 157 MG/DL (ref 0–149)
VLDLC SERPL CALC-MCNC: 31 MG/DL

## 2022-06-01 ENCOUNTER — TELEPHONE (OUTPATIENT)
Dept: ENT CLINIC | Age: 82
End: 2022-06-01

## 2022-06-01 NOTE — TELEPHONE ENCOUNTER
Contacted pt to reschedule appt on 6/16/22, pt lives out of town and has to travel to appt. appt rescheduled to best poss day for pt 6/23/22 at 1315.

## 2022-06-23 ENCOUNTER — OFFICE VISIT (OUTPATIENT)
Dept: ENT CLINIC | Age: 82
End: 2022-06-23
Payer: MEDICARE

## 2022-06-23 ENCOUNTER — PROCEDURE VISIT (OUTPATIENT)
Dept: AUDIOLOGY | Age: 82
End: 2022-06-23
Payer: MEDICARE

## 2022-06-23 VITALS
HEIGHT: 72 IN | DIASTOLIC BLOOD PRESSURE: 73 MMHG | SYSTOLIC BLOOD PRESSURE: 134 MMHG | WEIGHT: 187.5 LBS | BODY MASS INDEX: 25.4 KG/M2 | HEART RATE: 55 BPM

## 2022-06-23 DIAGNOSIS — H90.3 SENSORINEURAL HEARING LOSS (SNHL) OF BOTH EARS: ICD-10-CM

## 2022-06-23 DIAGNOSIS — H93.13 TINNITUS OF BOTH EARS: ICD-10-CM

## 2022-06-23 DIAGNOSIS — H91.8X9 ASYMMETRICAL HEARING LOSS: Primary | ICD-10-CM

## 2022-06-23 DIAGNOSIS — H91.8X9 ASYMMETRICAL HEARING LOSS: ICD-10-CM

## 2022-06-23 DIAGNOSIS — H61.22 IMPACTED CERUMEN OF LEFT EAR: ICD-10-CM

## 2022-06-23 PROCEDURE — 1123F ACP DISCUSS/DSCN MKR DOCD: CPT | Performed by: NURSE PRACTITIONER

## 2022-06-23 PROCEDURE — 92567 TYMPANOMETRY: CPT | Performed by: AUDIOLOGIST

## 2022-06-23 PROCEDURE — 99204 OFFICE O/P NEW MOD 45 MIN: CPT | Performed by: NURSE PRACTITIONER

## 2022-06-23 PROCEDURE — 69210 REMOVE IMPACTED EAR WAX UNI: CPT | Performed by: NURSE PRACTITIONER

## 2022-06-23 PROCEDURE — 92557 COMPREHENSIVE HEARING TEST: CPT | Performed by: AUDIOLOGIST

## 2022-06-23 RX ORDER — DOCUSATE SODIUM 100 MG/1
CAPSULE, LIQUID FILLED ORAL
COMMUNITY
Start: 2021-10-18

## 2022-06-23 ASSESSMENT — ENCOUNTER SYMPTOMS
STRIDOR: 0
SHORTNESS OF BREATH: 0
EYES NEGATIVE: 1
RESPIRATORY NEGATIVE: 1
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
## 2022-06-24 ENCOUNTER — TELEPHONE (OUTPATIENT)
Dept: ENT CLINIC | Age: 82
End: 2022-06-24

## 2022-06-24 NOTE — TELEPHONE ENCOUNTER
Pt was to schedule 1 month followup with MRI results. At this time, per his wife, he cannot schedule anything due to it being fishing season and them living on [address]. She would like to be called with the MRI results and will schedule accordingly. Thank you.

## 2022-06-24 NOTE — PROGRESS NOTES
This patient was referred for audiometric/tympanometric testing by JACKELIN Baires due to a longstanding bilateral decrease in hearing sensitivity. Patient is also experiencing tinnitus, bilaterally. Patient denied ear pressure/pain/pressure, but denied vertigo. Audiometry using pure tone air and bone conduction testing revealed a mild-to-severe  sensorineural hearing loss, through the frequency range, bilaterally (left ear; worse). Reliability was good. Speech reception thresholds were in good agreement with the pure tone averages, bilaterally. Speech discrimination scores were 84%, right ear and 88%, left ear at 65dBHL. Tympanometry revealed normal middle ear peak pressure and compliance, bilaterally. Ipsilateral acoustic reflexes were absent, right ear and unable to be tested, left ear at 1000Hz. The results were reviewed with the patient. Recommendations for follow up will be made pending physician consult.     Rachel Carter CCC/RENEE  Audiologist  [id number]  NPI#:  [id number]

## 2022-06-30 DIAGNOSIS — Z01.812 PRE-OPERATIVE LABORATORY EXAMINATION: Primary | ICD-10-CM

## 2022-07-09 ENCOUNTER — HOSPITAL ENCOUNTER (OUTPATIENT)
Dept: MRI IMAGING | Age: 82
Discharge: HOME | End: 2022-07-11
Payer: MEDICARE

## 2022-07-09 DIAGNOSIS — H91.8X9 ASYMMETRICAL HEARING LOSS: ICD-10-CM

## 2022-07-09 LAB
BUN BLDV-MCNC: 13 MG/DL (ref 6–23)
CREAT SERPL-MCNC: 1 MG/DL (ref 0.7–1.2)
GFR AFRICAN AMERICAN: >60
GFR NON-AFRICAN AMERICAN: >60 ML/MIN/1.73

## 2022-07-09 PROCEDURE — 84520 ASSAY OF UREA NITROGEN: CPT

## 2022-07-09 PROCEDURE — 6360000004 HC RX CONTRAST MEDICATION: Performed by: RADIOLOGY

## 2022-07-09 PROCEDURE — A9577 INJ MULTIHANCE: HCPCS | Performed by: RADIOLOGY

## 2022-07-09 PROCEDURE — 36415 COLL VENOUS BLD VENIPUNCTURE: CPT

## 2022-07-09 PROCEDURE — 70553 MRI BRAIN STEM W/O & W/DYE: CPT

## 2022-07-09 PROCEDURE — 82565 ASSAY OF CREATININE: CPT

## 2022-07-09 RX ADMIN — GADOBENATE DIMEGLUMINE 17 ML: 529 INJECTION, SOLUTION INTRAVENOUS at 08:38

## 2022-07-15 ENCOUNTER — TELEPHONE (OUTPATIENT)
Dept: ENT CLINIC | Age: 82
End: 2022-07-15

## 2022-07-18 NOTE — TELEPHONE ENCOUNTER
Patient MRI is negative for any intracranial abnormalities she can let them know that, there is some mild trace sinus disease or not concerned at this time they can continue follow-up as needed.

## 2022-07-20 ENCOUNTER — TELEPHONE (OUTPATIENT)
Dept: AUDIOLOGY | Age: 82
End: 2022-07-20

## 2022-07-22 ENCOUNTER — TELEPHONE (OUTPATIENT)
Dept: ENT CLINIC | Age: 82
End: 2022-07-22

## 2022-07-22 NOTE — TELEPHONE ENCOUNTER
Pt wife called stating she received an EOB in the mail for Pt appt on 6/23/22 w/NP May Mcrae. She states he was charged for an Audiogram when only a Tympanogram was performed. I called and advised wife there was an audio and a tymp performed that day and she ok'd. She also stated she was confused about a conversation she had over the phone with someone from audiology who stated audios can only be performed 1 year and 1 day apart, that based on his last audio he could schedule to be seen later in the month. I advised her according to out chart, his next audio would be 6/24/2023, but I would update audiology and have everything we discussed verified. She ok'd this as well.

## 2022-11-07 DIAGNOSIS — E78.01 FAMILIAL HYPERCHOLESTEROLEMIA: ICD-10-CM

## 2022-11-07 DIAGNOSIS — E11.9 TYPE 2 DIABETES MELLITUS WITHOUT COMPLICATION, WITHOUT LONG-TERM CURRENT USE OF INSULIN (HCC): ICD-10-CM

## 2022-11-07 DIAGNOSIS — E55.9 VITAMIN D DEFICIENCY: ICD-10-CM

## 2022-11-07 DIAGNOSIS — D50.0 IRON DEFICIENCY ANEMIA SECONDARY TO BLOOD LOSS (CHRONIC): ICD-10-CM

## 2022-11-07 DIAGNOSIS — E03.9 PRIMARY HYPOTHYROIDISM: ICD-10-CM

## 2022-11-07 LAB
ALBUMIN SERPL-MCNC: 4.5 G/DL (ref 3.5–5.2)
ALP BLD-CCNC: 36 U/L (ref 40–129)
ALT SERPL-CCNC: 11 U/L (ref 0–40)
ANION GAP SERPL CALCULATED.3IONS-SCNC: 9 MMOL/L (ref 7–16)
AST SERPL-CCNC: 18 U/L (ref 0–39)
BILIRUB SERPL-MCNC: 0.5 MG/DL (ref 0–1.2)
BUN BLDV-MCNC: 11 MG/DL (ref 6–23)
CALCIUM SERPL-MCNC: 9.3 MG/DL (ref 8.6–10.2)
CHLORIDE BLD-SCNC: 104 MMOL/L (ref 98–107)
CHOLESTEROL, TOTAL: 112 MG/DL (ref 0–199)
CO2: 27 MMOL/L (ref 22–29)
CREAT SERPL-MCNC: 0.9 MG/DL (ref 0.7–1.2)
GFR SERPL CREATININE-BSD FRML MDRD: >60 ML/MIN/1.73
GLUCOSE BLD-MCNC: 110 MG/DL (ref 74–99)
HBA1C MFR BLD: 6.4 % (ref 4–5.6)
HCT VFR BLD CALC: 42.8 % (ref 37–54)
HDLC SERPL-MCNC: 40 MG/DL
HEMOGLOBIN: 15 G/DL (ref 12.5–16.5)
LDL CHOLESTEROL CALCULATED: 43 MG/DL (ref 0–99)
MCH RBC QN AUTO: 33.3 PG (ref 26–35)
MCHC RBC AUTO-ENTMCNC: 35 % (ref 32–34.5)
MCV RBC AUTO: 95.1 FL (ref 80–99.9)
PDW BLD-RTO: 12.5 FL (ref 11.5–15)
PLATELET # BLD: 163 E9/L (ref 130–450)
PMV BLD AUTO: 11.8 FL (ref 7–12)
POTASSIUM SERPL-SCNC: 5.1 MMOL/L (ref 3.5–5)
RBC # BLD: 4.5 E12/L (ref 3.8–5.8)
SODIUM BLD-SCNC: 140 MMOL/L (ref 132–146)
TOTAL PROTEIN: 7.1 G/DL (ref 6.4–8.3)
TRIGL SERPL-MCNC: 145 MG/DL (ref 0–149)
TSH SERPL DL<=0.05 MIU/L-ACNC: 2.98 UIU/ML (ref 0.27–4.2)
VITAMIN D 25-HYDROXY: 69 NG/ML (ref 30–100)
VLDLC SERPL CALC-MCNC: 29 MG/DL
WBC # BLD: 7.5 E9/L (ref 4.5–11.5)

## 2023-04-05 ENCOUNTER — HOSPITAL ENCOUNTER (EMERGENCY)
Age: 83
Discharge: HOME | End: 2023-04-05
Attending: EMERGENCY MEDICINE
Payer: MEDICARE

## 2023-04-05 ENCOUNTER — APPOINTMENT (OUTPATIENT)
Dept: GENERAL RADIOLOGY | Age: 83
End: 2023-04-05
Payer: MEDICARE

## 2023-04-05 VITALS
DIASTOLIC BLOOD PRESSURE: 60 MMHG | OXYGEN SATURATION: 94 % | RESPIRATION RATE: 16 BRPM | BODY MASS INDEX: 25.06 KG/M2 | WEIGHT: 185 LBS | HEIGHT: 72 IN | SYSTOLIC BLOOD PRESSURE: 156 MMHG | HEART RATE: 64 BPM | TEMPERATURE: 98.1 F

## 2023-04-05 DIAGNOSIS — U07.1 COVID-19: Primary | ICD-10-CM

## 2023-04-05 LAB
ALBUMIN SERPL-MCNC: 4 G/DL (ref 3.5–5.2)
ALP SERPL-CCNC: 35 U/L (ref 40–129)
ALT SERPL-CCNC: 13 U/L (ref 0–40)
ANION GAP SERPL CALCULATED.3IONS-SCNC: 9 MMOL/L (ref 7–16)
AST SERPL-CCNC: 19 U/L (ref 0–39)
BASOPHILS # BLD: 0.02 E9/L (ref 0–0.2)
BASOPHILS NFR BLD: 0.2 % (ref 0–2)
BILIRUB SERPL-MCNC: 0.5 MG/DL (ref 0–1.2)
BUN SERPL-MCNC: 13 MG/DL (ref 6–23)
CALCIUM SERPL-MCNC: 8.8 MG/DL (ref 8.6–10.2)
CHLORIDE SERPL-SCNC: 100 MMOL/L (ref 98–107)
CHP ED QC CHECK: NORMAL
CO2 SERPL-SCNC: 27 MMOL/L (ref 22–29)
CREAT SERPL-MCNC: 1.1 MG/DL (ref 0.7–1.2)
EOSINOPHIL # BLD: 0.01 E9/L (ref 0.05–0.5)
EOSINOPHIL NFR BLD: 0.1 % (ref 0–6)
ERYTHROCYTE [DISTWIDTH] IN BLOOD BY AUTOMATED COUNT: 12.6 FL (ref 11.5–15)
GLUCOSE BLD-MCNC: 165 MG/DL
GLUCOSE SERPL-MCNC: 155 MG/DL (ref 74–99)
HCT VFR BLD AUTO: 42.8 % (ref 37–54)
HGB BLD-MCNC: 14.3 G/DL (ref 12.5–16.5)
IMM GRANULOCYTES # BLD: 0.03 E9/L
IMM GRANULOCYTES NFR BLD: 0.4 % (ref 0–5)
LYMPHOCYTES # BLD: 1.11 E9/L (ref 1.5–4)
LYMPHOCYTES NFR BLD: 13.6 % (ref 20–42)
MCH RBC QN AUTO: 31.6 PG (ref 26–35)
MCHC RBC AUTO-ENTMCNC: 33.4 % (ref 32–34.5)
MCV RBC AUTO: 94.5 FL (ref 80–99.9)
METER GLUCOSE: 165 MG/DL (ref 74–99)
MONOCYTES # BLD: 0.95 E9/L (ref 0.1–0.95)
MONOCYTES NFR BLD: 11.7 % (ref 2–12)
NEUTROPHILS # BLD: 6.02 E9/L (ref 1.8–7.3)
NEUTS SEG NFR BLD: 74 % (ref 43–80)
PLATELET # BLD AUTO: 136 E9/L (ref 130–450)
PMV BLD AUTO: 10.7 FL (ref 7–12)
POTASSIUM SERPL-SCNC: 4.5 MMOL/L (ref 3.5–5)
PROT SERPL-MCNC: 6.6 G/DL (ref 6.4–8.3)
RBC # BLD AUTO: 4.53 E12/L (ref 3.8–5.8)
SARS-COV-2 RDRP RESP QL NAA+PROBE: DETECTED
SODIUM SERPL-SCNC: 136 MMOL/L (ref 132–146)
WBC # BLD: 8.1 E9/L (ref 4.5–11.5)

## 2023-04-05 PROCEDURE — 6360000002 HC RX W HCPCS

## 2023-04-05 PROCEDURE — 82962 GLUCOSE BLOOD TEST: CPT

## 2023-04-05 PROCEDURE — 87635 SARS-COV-2 COVID-19 AMP PRB: CPT

## 2023-04-05 PROCEDURE — 6370000000 HC RX 637 (ALT 250 FOR IP)

## 2023-04-05 PROCEDURE — 2580000003 HC RX 258

## 2023-04-05 PROCEDURE — 71045 X-RAY EXAM CHEST 1 VIEW: CPT

## 2023-04-05 PROCEDURE — 85025 COMPLETE CBC W/AUTO DIFF WBC: CPT

## 2023-04-05 PROCEDURE — 80053 COMPREHEN METABOLIC PANEL: CPT

## 2023-04-05 RX ORDER — BENZONATATE 100 MG/1
100 CAPSULE ORAL 3 TIMES DAILY PRN
Qty: 21 CAPSULE | Refills: 0 | Status: SHIPPED | OUTPATIENT
Start: 2023-04-05 | End: 2023-04-12

## 2023-04-05 RX ORDER — 0.9 % SODIUM CHLORIDE 0.9 %
1000 INTRAVENOUS SOLUTION INTRAVENOUS ONCE
Status: COMPLETED | OUTPATIENT
Start: 2023-04-05 | End: 2023-04-05

## 2023-04-05 RX ORDER — BENZONATATE 100 MG/1
100 CAPSULE ORAL ONCE
Status: COMPLETED | OUTPATIENT
Start: 2023-04-05 | End: 2023-04-05

## 2023-04-05 RX ORDER — KETOROLAC TROMETHAMINE 15 MG/ML
15 INJECTION, SOLUTION INTRAMUSCULAR; INTRAVENOUS ONCE
Status: COMPLETED | OUTPATIENT
Start: 2023-04-05 | End: 2023-04-05

## 2023-04-05 RX ADMIN — KETOROLAC TROMETHAMINE 15 MG: 15 INJECTION, SOLUTION INTRAMUSCULAR; INTRAVENOUS at 13:52

## 2023-04-05 RX ADMIN — SODIUM CHLORIDE 1000 ML: 9 INJECTION, SOLUTION INTRAVENOUS at 12:30

## 2023-04-05 RX ADMIN — BENZONATATE 100 MG: 100 CAPSULE ORAL at 12:29

## 2023-04-05 ASSESSMENT — PAIN - FUNCTIONAL ASSESSMENT
PAIN_FUNCTIONAL_ASSESSMENT: 0-10
PAIN_FUNCTIONAL_ASSESSMENT: NONE - DENIES PAIN

## 2023-04-05 ASSESSMENT — PAIN SCALES - GENERAL: PAINLEVEL_OUTOF10: 8

## 2023-04-05 NOTE — ED PROVIDER NOTES
Cough       DISCONTINUED MEDICATIONS:  Discontinued Medications    No medications on file              (Please note that portions of this note were completed with a voice recognition program.  Efforts were made to edit the dictations but occasionally words are mis-transcribed.)         Judith Gutierrez MD  Resident  04/05/23 [id number]

## 2023-04-08 ENCOUNTER — HOSPITAL ENCOUNTER (EMERGENCY)
Age: 83
Discharge: HOME | End: 2023-04-08
Payer: MEDICARE

## 2023-04-08 VITALS
TEMPERATURE: 98.1 F | RESPIRATION RATE: 18 BRPM | SYSTOLIC BLOOD PRESSURE: 138 MMHG | OXYGEN SATURATION: 98 % | HEART RATE: 67 BPM | DIASTOLIC BLOOD PRESSURE: 77 MMHG

## 2023-04-08 DIAGNOSIS — U07.1 COVID-19: Primary | ICD-10-CM

## 2023-04-08 PROCEDURE — 99211 OFF/OP EST MAY X REQ PHY/QHP: CPT

## 2023-04-08 RX ORDER — PREDNISONE 20 MG/1
TABLET ORAL
Qty: 8 TABLET | Refills: 0 | Status: SHIPPED | OUTPATIENT
Start: 2023-04-08

## 2023-04-08 RX ORDER — ONDANSETRON 4 MG/1
4 TABLET, ORALLY DISINTEGRATING ORAL 3 TIMES DAILY PRN
Qty: 12 TABLET | Refills: 0 | Status: SHIPPED | OUTPATIENT
Start: 2023-04-08

## 2023-04-08 ASSESSMENT — PAIN - FUNCTIONAL ASSESSMENT: PAIN_FUNCTIONAL_ASSESSMENT: 0-10

## 2023-04-08 NOTE — ED PROVIDER NOTES
Pulmonary:      Effort: Pulmonary effort is normal.      Breath sounds: Normal breath sounds. Abdominal:      Tenderness: There is no abdominal tenderness. Musculoskeletal:         General: Normal range of motion. Cervical back: Full passive range of motion without pain, normal range of motion and neck supple. Lymphadenopathy:      Cervical: No cervical adenopathy. Skin:     General: Skin is warm and dry. Findings: No rash. Neurological:      General: No focal deficit present. Mental Status: He is alert and oriented to person, place, and time. Cranial Nerves: Cranial nerves 2-12 are intact. Sensory: Sensation is intact. Motor: Motor function is intact. Coordination: Coordination is intact. Gait: Gait is intact. Psychiatric:         Behavior: Behavior is cooperative.       -------------------------------------------------- RESULTS -------------------------------------------------  No results found for this visit on 04/08/23. No orders to display       Labs Reviewed - No data to display    When ordered only abnormal lab results are displayed. All other labs were within normal range or not returned as of this dictation. Interpretation per the Radiologist below, if available at the time of this note:    No orders to display     XR CHEST PORTABLE    Result Date: 4/5/2023  EXAMINATION: ONE XRAY VIEW OF THE CHEST 4/5/2023 12:24 pm COMPARISON: 02/08/2020 HISTORY: ORDERING SYSTEM PROVIDED HISTORY: cough, covid+ TECHNOLOGIST PROVIDED HISTORY: Reason for exam:->cough, covid+ FINDINGS: The lungs are without acute focal process. There is no effusion or pneumothorax. The cardiomediastinal silhouette is without acute process. The osseous structures are without acute process. No acute process.        No results found.     -------------------------------------------------PROCEDURES--------------------------------------------  Unless otherwise noted below, none

## 2023-05-09 DIAGNOSIS — R73.9 HYPERGLYCEMIA: ICD-10-CM

## 2023-05-09 DIAGNOSIS — E78.01 FAMILIAL HYPERCHOLESTEROLEMIA: ICD-10-CM

## 2023-05-09 LAB
ALBUMIN SERPL-MCNC: 4.5 G/DL (ref 3.5–5.2)
ALP SERPL-CCNC: 38 U/L (ref 40–129)
ALT SERPL-CCNC: 14 U/L (ref 0–40)
ANION GAP SERPL CALCULATED.3IONS-SCNC: 15 MMOL/L (ref 7–16)
AST SERPL-CCNC: 17 U/L (ref 0–39)
BILIRUB SERPL-MCNC: 0.4 MG/DL (ref 0–1.2)
BUN SERPL-MCNC: 14 MG/DL (ref 6–23)
CALCIUM SERPL-MCNC: 9 MG/DL (ref 8.6–10.2)
CHLORIDE SERPL-SCNC: 105 MMOL/L (ref 98–107)
CHOLESTEROL, TOTAL: 123 MG/DL (ref 0–199)
CO2 SERPL-SCNC: 20 MMOL/L (ref 22–29)
CREAT SERPL-MCNC: 0.8 MG/DL (ref 0.7–1.2)
GLUCOSE SERPL-MCNC: 129 MG/DL (ref 74–99)
HBA1C MFR BLD: 7.5 % (ref 4–5.6)
HDLC SERPL-MCNC: 43 MG/DL
LDLC SERPL CALC-MCNC: 54 MG/DL (ref 0–99)
POTASSIUM SERPL-SCNC: 4.4 MMOL/L (ref 3.5–5)
PROT SERPL-MCNC: 7.4 G/DL (ref 6.4–8.3)
SODIUM SERPL-SCNC: 140 MMOL/L (ref 132–146)
TRIGL SERPL-MCNC: 131 MG/DL (ref 0–149)
VLDLC SERPL CALC-MCNC: 26 MG/DL

## 2023-05-12 ENCOUNTER — TELEPHONE (OUTPATIENT)
Dept: ENT CLINIC | Age: 83
End: 2023-05-12

## 2023-05-12 NOTE — TELEPHONE ENCOUNTER
Pt's wife called in, she stated a stick went into the pt's ear, he is experiencing a little bit pain, he hears crackling and popping sounds. She would like to know if the pt can be seen sooner than first avail? Pt is est. Please, advise.  Thank you

## 2023-05-15 ENCOUNTER — OFFICE VISIT (OUTPATIENT)
Dept: ENT CLINIC | Age: 83
End: 2023-05-15
Payer: MEDICARE

## 2023-05-15 VITALS
HEIGHT: 72 IN | BODY MASS INDEX: 25.76 KG/M2 | SYSTOLIC BLOOD PRESSURE: 156 MMHG | DIASTOLIC BLOOD PRESSURE: 71 MMHG | WEIGHT: 190.2 LBS | HEART RATE: 46 BPM

## 2023-05-15 DIAGNOSIS — H72.92 PERFORATION OF LEFT TYMPANIC MEMBRANE: Primary | ICD-10-CM

## 2023-05-15 DIAGNOSIS — H72.92 PERFORATION OF LEFT TYMPANIC MEMBRANE: ICD-10-CM

## 2023-05-15 DIAGNOSIS — H60.392 OTHER INFECTIVE ACUTE OTITIS EXTERNA OF LEFT EAR: ICD-10-CM

## 2023-05-15 PROCEDURE — 99213 OFFICE O/P EST LOW 20 MIN: CPT | Performed by: NURSE PRACTITIONER

## 2023-05-15 PROCEDURE — 1123F ACP DISCUSS/DSCN MKR DOCD: CPT | Performed by: NURSE PRACTITIONER

## 2023-05-15 PROCEDURE — 92504 EAR MICROSCOPY EXAMINATION: CPT | Performed by: NURSE PRACTITIONER

## 2023-05-15 RX ORDER — CIPROFLOXACIN HYDROCHLORIDE 3.5 MG/ML
4 SOLUTION/ DROPS TOPICAL 2 TIMES DAILY
Qty: 1 EACH | Refills: 2 | Status: SHIPPED | OUTPATIENT
Start: 2023-05-15 | End: 2023-05-25

## 2023-05-15 ASSESSMENT — ENCOUNTER SYMPTOMS
EYES NEGATIVE: 1
STRIDOR: 0
RESPIRATORY NEGATIVE: 1
SHORTNESS OF BREATH: 0

## 2023-05-15 NOTE — PROGRESS NOTES
Rfl:     amLODIPine (NORVASC) 5 MG tablet, TAKE ONE TABLET BY MOUTH EVERY DAY, Disp: , Rfl:     aspirin 81 MG EC tablet, Take 1 tablet by mouth daily, Disp: , Rfl:     clopidogrel (PLAVIX) 75 MG tablet, Take 1 tablet by mouth daily, Disp: , Rfl:     Cholecalciferol (VITAMIN D3) 125 MCG (5000 UT) TABS, Take by mouth, Disp: , Rfl:     metoprolol succinate (TOPROL XL) 25 MG extended release tablet, Take 0.5 tablets by mouth daily, Disp: , Rfl:     Ascorbic Acid (VITAMIN C) 500 MG tablet, Take 1 tablet by mouth daily, Disp: , Rfl:   Patient has no known allergies. Social History     Tobacco Use    Smoking status: Former     Packs/day: 1.00     Years: 20.00     Pack years: 20.00     Types: Cigarettes     Quit date: 3/26/1969     Years since quittin.1    Smokeless tobacco: Never   Vaping Use    Vaping Use: Never used   Substance Use Topics    Alcohol use: No    Drug use: No     History reviewed. No pertinent family history. Review of Systems   Constitutional: Negative. Negative for activity change and appetite change. HENT:  Positive for ear discharge, ear pain and hearing loss (Muffled hearing). Eyes: Negative. Respiratory: Negative. Negative for shortness of breath and stridor. Cardiovascular: Negative. Negative for chest pain and palpitations. Endocrine: Negative. Musculoskeletal: Negative. Skin: Negative. Neurological: Negative. Negative for dizziness. Hematological: Negative. Psychiatric/Behavioral: Negative. BP (!) 156/71 (Site: Right Upper Arm, Position: Sitting, Cuff Size: Medium Adult)   Pulse (!) 46   Ht 6' (1.829 m)   Wt 190 lb 3.2 oz (86.3 kg)   BMI 25.80 kg/m²   Physical Exam  Constitutional:       Appearance: Normal appearance. HENT:      Head: Normocephalic. Right Ear: Tympanic membrane, ear canal and external ear normal.      Left Ear: External ear normal. Drainage and tenderness present. Tympanic membrane is perforated.       Ears:        Nose: Nose

## 2023-05-17 LAB
BACTERIA SPEC AEROBE CULT: NORMAL
GRAM STN SPEC: NORMAL

## 2023-05-17 NOTE — PROGRESS NOTES
Spoke to pt and his wife advising pt to continue Cipro drops per NP Cherelle and keep same appointment. Pt understood.

## 2023-05-25 ENCOUNTER — OFFICE VISIT (OUTPATIENT)
Dept: ENT CLINIC | Age: 83
End: 2023-05-25
Payer: MEDICARE

## 2023-05-25 ENCOUNTER — PROCEDURE VISIT (OUTPATIENT)
Dept: AUDIOLOGY | Age: 83
End: 2023-05-25
Payer: MEDICARE

## 2023-05-25 VITALS
DIASTOLIC BLOOD PRESSURE: 68 MMHG | WEIGHT: 189 LBS | HEIGHT: 72 IN | HEART RATE: 48 BPM | SYSTOLIC BLOOD PRESSURE: 160 MMHG | BODY MASS INDEX: 25.6 KG/M2

## 2023-05-25 DIAGNOSIS — H66.012 ACUTE SUPPURATIVE OTITIS MEDIA OF LEFT EAR WITH SPONTANEOUS RUPTURE OF TYMPANIC MEMBRANE, RECURRENCE NOT SPECIFIED: ICD-10-CM

## 2023-05-25 DIAGNOSIS — H69.82 DYSFUNCTION OF LEFT EUSTACHIAN TUBE: Primary | ICD-10-CM

## 2023-05-25 DIAGNOSIS — H65.492 COME (CHRONIC OTITIS MEDIA WITH EFFUSION), LEFT: Primary | ICD-10-CM

## 2023-05-25 PROCEDURE — 1123F ACP DISCUSS/DSCN MKR DOCD: CPT | Performed by: NURSE PRACTITIONER

## 2023-05-25 PROCEDURE — 92567 TYMPANOMETRY: CPT | Performed by: AUDIOLOGIST

## 2023-05-25 PROCEDURE — 99213 OFFICE O/P EST LOW 20 MIN: CPT | Performed by: NURSE PRACTITIONER

## 2023-05-25 RX ORDER — AMOXICILLIN AND CLAVULANATE POTASSIUM 875; 125 MG/1; MG/1
1 TABLET, FILM COATED ORAL 2 TIMES DAILY
Qty: 20 TABLET | Refills: 0 | Status: SHIPPED | OUTPATIENT
Start: 2023-05-25 | End: 2023-06-04

## 2023-05-25 RX ORDER — FLUTICASONE PROPIONATE 50 MCG
2 SPRAY, SUSPENSION (ML) NASAL DAILY
Qty: 16 G | Refills: 1 | Status: SHIPPED | OUTPATIENT
Start: 2023-05-25

## 2023-05-25 ASSESSMENT — ENCOUNTER SYMPTOMS
SHORTNESS OF BREATH: 0
RESPIRATORY NEGATIVE: 1
STRIDOR: 0
EYES NEGATIVE: 1

## 2023-05-25 NOTE — PROGRESS NOTES
Pupils: Pupils are equal, round, and reactive to light. Cardiovascular:      Rate and Rhythm: Normal rate and regular rhythm. Pulses: Normal pulses. Pulmonary:      Effort: Pulmonary effort is normal. No respiratory distress. Breath sounds: No stridor. Musculoskeletal:         General: Normal range of motion. Cervical back: Normal range of motion. No rigidity. No muscular tenderness. Skin:     General: Skin is warm and dry. Neurological:      General: No focal deficit present. Mental Status: He is alert and oriented to person, place, and time. Psychiatric:         Mood and Affect: Mood normal.         Behavior: Behavior normal.         Thought Content: Thought content normal.         Judgment: Judgment normal.       Tympanogram reviewed with patient. Reveals type A curve in the right ear, with type Flat curve in the left ear. IMPRESSION/PLAN:    Sunday Guy was seen today for follow-up. Diagnoses and all orders for this visit:    Dysfunction of left eustachian tube    Acute suppurative otitis media of left ear with spontaneous rupture of tympanic membrane, recurrence not specified    Other orders  -     amoxicillin-clavulanate (AUGMENTIN) 875-125 MG per tablet; Take 1 tablet by mouth 2 times daily for 10 days  -     fluticasone (FLONASE) 50 MCG/ACT nasal spray; 2 sprays by Each Nostril route daily    At this time patient replaced on Augmentin, 1 tablet twice daily for 10 days for acute middle ear infection on the left. Also start on Flonase, sprays nostril once daily for eustachian tube dysfunction on the left. Patient follow-up in 6 weeks.   Instructed to call with any new or worsening symptoms prior to his next appointment      Michelle Salvador, MSN, FNP-C  [facility], Nose and Throat    The information contained in this note has been dictated using drug and medical speech recognition software and may contain errors

## 2023-05-25 NOTE — PROGRESS NOTES
This patient was referred for tympanometric testing by JACKELIN Vargas due to COME, left ear    Tympanometry revealed normal middle ear peak pressure and compliance, right ear and a flat tympanogram, with no middle ear peak pressure, left ear. The results were reviewed with the patient. Recommendations for follow up will be made pending physician consult.     Kathrin Downing CCC/RENEE  Audiologist  [id number]  NPI#:  [id number]      Electronically signed by Chemo Powell on 5/25/2023 at 9:14 AM

## 2023-07-13 ENCOUNTER — OFFICE VISIT (OUTPATIENT)
Dept: ENT CLINIC | Age: 83
End: 2023-07-13
Payer: MEDICARE

## 2023-07-13 ENCOUNTER — PROCEDURE VISIT (OUTPATIENT)
Dept: AUDIOLOGY | Age: 83
End: 2023-07-13
Payer: MEDICARE

## 2023-07-13 VITALS
WEIGHT: 139 LBS | BODY MASS INDEX: 18.83 KG/M2 | HEIGHT: 72 IN | HEART RATE: 45 BPM | DIASTOLIC BLOOD PRESSURE: 61 MMHG | SYSTOLIC BLOOD PRESSURE: 145 MMHG

## 2023-07-13 DIAGNOSIS — H69.82 DYSFUNCTION OF LEFT EUSTACHIAN TUBE: Primary | ICD-10-CM

## 2023-07-13 DIAGNOSIS — H65.492 COME (CHRONIC OTITIS MEDIA WITH EFFUSION), LEFT: Primary | ICD-10-CM

## 2023-07-13 DIAGNOSIS — H65.02 ACUTE SEROUS OTITIS MEDIA OF LEFT EAR, RECURRENCE NOT SPECIFIED: ICD-10-CM

## 2023-07-13 PROCEDURE — 1123F ACP DISCUSS/DSCN MKR DOCD: CPT | Performed by: NURSE PRACTITIONER

## 2023-07-13 PROCEDURE — 92567 TYMPANOMETRY: CPT | Performed by: AUDIOLOGIST

## 2023-07-13 PROCEDURE — 99213 OFFICE O/P EST LOW 20 MIN: CPT | Performed by: NURSE PRACTITIONER

## 2023-07-13 RX ORDER — FLUTICASONE PROPIONATE 50 MCG
2 SPRAY, SUSPENSION (ML) NASAL DAILY
Qty: 16 G | Refills: 1 | Status: SHIPPED | OUTPATIENT
Start: 2023-07-13

## 2023-07-13 RX ORDER — AZELASTINE 1 MG/ML
1-2 SPRAY, METERED NASAL 2 TIMES DAILY PRN
Qty: 30 ML | Refills: 1 | Status: SHIPPED | OUTPATIENT
Start: 2023-07-13

## 2023-07-13 ASSESSMENT — ENCOUNTER SYMPTOMS
EYES NEGATIVE: 1
RESPIRATORY NEGATIVE: 1
STRIDOR: 0
SHORTNESS OF BREATH: 0

## 2023-07-13 NOTE — PROGRESS NOTES
[address] Otolaryngology  Dr. Eduardo Ward. Ms.Ed        Patient Name:  Ludwin Lawson  :  1940     CHIEF C/O:    Chief Complaint   Patient presents with    Follow-up     Pt states left ear is the same, no change        HISTORY OBTAINED FROM:  patient, spouse    HISTORY OF PRESENT ILLNESS:       Dax Jane is a 81y.o. year old male, here today for follow up of:       Left ear fullness. Patient is less than 6 weeks ago and was prescribed Flonase for eustachian tube dysfunction. He states he is only used it for approximately 4 weeks and has not used for several days. He continues to have a fullness sensation in the left ear. He does note some intermittent crackling in the left ear. He denies any ear pain or pressure. He denies any dizziness. He denies any recent fevers or recent antibiotics. Past Medical History:   Diagnosis Date    Aneurysm of aorta (HCC)     CAD (coronary artery disease)     HLD (hyperlipidemia)     Hypertension     Neck pain     PAD (peripheral artery disease) (HCC)     Pulmonary emboli (HCC)     TIA (transient ischemic attack)     UTI (lower urinary tract infection)      Past Surgical History:   Procedure Laterality Date    ANOMALOUS VENOUS RETURN REPAIR      CARDIAC SURGERY      CAROTID STENT      CORONARY ARTERY BYPASS GRAFT      CRANIOTOMY Right 2020    CRANIOTOMY BRIANNA HOLES performed by Kwadwo Rueda MD at [address] [place] [address]         Current Outpatient Medications:     fluticasone (FLONASE) 50 MCG/ACT nasal spray, 2 sprays by Each Nostril route daily, Disp: 16 g, Rfl: 1    rosuvastatin (CRESTOR) 20 MG tablet, Take 1 tablet by mouth nightly, Disp: 90 tablet, Rfl: 1    blood glucose test strips (ONETOUCH ULTRA) strip, 1 each by In Vitro route daily As needed. , Disp: 100 each, Rfl: 3    OneTouch Delica Lancets 71F MISC, 1 each by Does not apply route daily, Disp: 100 each, Rfl: 3    docusate sodium (COLACE) 100 MG capsule, , Disp: , Rfl:     amLODIPine

## 2023-07-13 NOTE — PROGRESS NOTES
This patient was referred for audiometric and tympanometric testing by JACKELIN Arzola due to recent ear infection. Tympanometry revealed normal middle ear peak pressure and compliance, in the right ear. The results were reviewed with the patient. Recommendations for follow up will be made pending physician consult.       Chemo Tobin CCC-A  [address] [place] [address]   Electronically signed by Chemo Tobin on 7/13/2023 at 8:14 AM

## 2023-07-24 ENCOUNTER — TELEPHONE (OUTPATIENT)
Dept: ENT CLINIC | Age: 83
End: 2023-07-24

## 2023-07-24 ENCOUNTER — OFFICE VISIT (OUTPATIENT)
Dept: FAMILY MEDICINE CLINIC | Age: 83
End: 2023-07-24
Payer: MEDICARE

## 2023-07-24 VITALS
SYSTOLIC BLOOD PRESSURE: 121 MMHG | OXYGEN SATURATION: 98 % | HEART RATE: 65 BPM | BODY MASS INDEX: 18.83 KG/M2 | TEMPERATURE: 97.6 F | DIASTOLIC BLOOD PRESSURE: 59 MMHG | RESPIRATION RATE: 17 BRPM | WEIGHT: 139 LBS | HEIGHT: 72 IN

## 2023-07-24 DIAGNOSIS — J02.9 SORE THROAT: ICD-10-CM

## 2023-07-24 DIAGNOSIS — B37.0 THRUSH, ORAL: Primary | ICD-10-CM

## 2023-07-24 PROCEDURE — 99213 OFFICE O/P EST LOW 20 MIN: CPT

## 2023-07-24 PROCEDURE — 1123F ACP DISCUSS/DSCN MKR DOCD: CPT

## 2023-07-24 PROCEDURE — 87880 STREP A ASSAY W/OPTIC: CPT

## 2023-07-24 RX ORDER — LORATADINE 10 MG/1
10 TABLET ORAL DAILY
Qty: 30 TABLET | Refills: 0 | Status: SHIPPED | OUTPATIENT
Start: 2023-07-24

## 2023-07-24 RX ORDER — CLOTRIMAZOLE 10 MG/1
10 LOZENGE ORAL; TOPICAL
Qty: 50 TABLET | Refills: 0 | Status: SHIPPED | OUTPATIENT
Start: 2023-07-24 | End: 2023-08-03

## 2023-07-24 NOTE — TELEPHONE ENCOUNTER
Spoke with patient's wife and she states that they came to our office to walk in and was informed that our office does not do walk ins so patient was seen in walk in care upstairs. Patient dx with thrush and given tx. Patient is currently not on any nasal sprays due to symptoms.       HUMERA

## 2023-07-24 NOTE — TELEPHONE ENCOUNTER
Patients wife called states that her  has a very sore throat, white spots on his uvula, and a thick white coating on his tongue. Patients states symptoms started shortly after using the Astelin nasal he used the Astelin for about 8 days. He stopped using the Astelin on Saturday. Patient is still not doing any better. Patient would like to know what to do?   Please advise

## 2023-07-25 LAB
CULTURE: NORMAL
CULTURE: NORMAL
SPECIMEN DESCRIPTION: NORMAL

## 2023-07-26 ENCOUNTER — HOSPITAL ENCOUNTER (EMERGENCY)
Age: 83
Discharge: HOME | End: 2023-07-26
Attending: EMERGENCY MEDICINE
Payer: MEDICARE

## 2023-07-26 VITALS
OXYGEN SATURATION: 98 % | SYSTOLIC BLOOD PRESSURE: 169 MMHG | DIASTOLIC BLOOD PRESSURE: 72 MMHG | TEMPERATURE: 98.2 F | RESPIRATION RATE: 18 BRPM | HEART RATE: 73 BPM | WEIGHT: 179 LBS | BODY MASS INDEX: 24.24 KG/M2 | HEIGHT: 72 IN

## 2023-07-26 DIAGNOSIS — J02.9 SORE THROAT: ICD-10-CM

## 2023-07-26 DIAGNOSIS — B37.0 ORAL THRUSH: Primary | ICD-10-CM

## 2023-07-26 LAB
ANION GAP SERPL CALCULATED.3IONS-SCNC: 13 MMOL/L (ref 7–16)
BASOPHILS # BLD: 0.03 K/UL (ref 0–0.2)
BASOPHILS NFR BLD: 0 % (ref 0–2)
BILIRUB UR QL STRIP: NEGATIVE
BUN SERPL-MCNC: 14 MG/DL (ref 6–23)
CALCIUM SERPL-MCNC: 9.1 MG/DL (ref 8.6–10.2)
CHLORIDE SERPL-SCNC: 103 MMOL/L (ref 98–107)
CLARITY UR: CLEAR
CO2 SERPL-SCNC: 23 MMOL/L (ref 22–29)
COLOR UR: YELLOW
COMMENT: ABNORMAL
CREAT SERPL-MCNC: 0.9 MG/DL (ref 0.7–1.2)
EOSINOPHIL # BLD: 0.32 K/UL (ref 0.05–0.5)
EOSINOPHILS RELATIVE PERCENT: 3 % (ref 0–6)
ERYTHROCYTE [DISTWIDTH] IN BLOOD BY AUTOMATED COUNT: 12.6 % (ref 11.5–15)
GFR SERPL CREATININE-BSD FRML MDRD: >60 ML/MIN/1.73M2
GLUCOSE SERPL-MCNC: 106 MG/DL (ref 74–99)
GLUCOSE UR STRIP-MCNC: NEGATIVE MG/DL
HCT VFR BLD AUTO: 38.6 % (ref 37–54)
HGB BLD-MCNC: 13 G/DL (ref 12.5–16.5)
HGB UR QL STRIP.AUTO: NEGATIVE
IMM GRANULOCYTES # BLD AUTO: 0.04 K/UL (ref 0–0.58)
IMM GRANULOCYTES NFR BLD: 0 % (ref 0–5)
INFLUENZA A BY PCR: NOT DETECTED
INFLUENZA B BY PCR: NOT DETECTED
KETONES UR STRIP-MCNC: 15 MG/DL
LEUKOCYTE ESTERASE UR QL STRIP: NEGATIVE
LYMPHOCYTES NFR BLD: 1.54 K/UL (ref 1.5–4)
LYMPHOCYTES RELATIVE PERCENT: 13 % (ref 20–42)
MCH RBC QN AUTO: 31.6 PG (ref 26–35)
MCHC RBC AUTO-ENTMCNC: 33.7 G/DL (ref 32–34.5)
MCV RBC AUTO: 93.9 FL (ref 80–99.9)
MONOCYTES NFR BLD: 1.29 K/UL (ref 0.1–0.95)
MONOCYTES NFR BLD: 11 % (ref 2–12)
NEUTROPHILS NFR BLD: 74 % (ref 43–80)
NEUTS SEG NFR BLD: 9.02 K/UL (ref 1.8–7.3)
NITRITE UR QL STRIP: NEGATIVE
PH UR STRIP: 5.5 [PH] (ref 5–9)
PLATELET # BLD AUTO: 143 K/UL (ref 130–450)
PMV BLD AUTO: 11.2 FL (ref 7–12)
POTASSIUM SERPL-SCNC: 4.2 MMOL/L (ref 3.5–5)
PROT UR STRIP-MCNC: NEGATIVE MG/DL
RBC # BLD AUTO: 4.11 M/UL (ref 3.8–5.8)
SARS-COV-2 RDRP RESP QL NAA+PROBE: NOT DETECTED
SODIUM SERPL-SCNC: 139 MMOL/L (ref 132–146)
SP GR UR STRIP: 1.01 (ref 1–1.03)
SPECIMEN DESCRIPTION: NORMAL
SPECIMEN SOURCE: NORMAL
STREP A, MOLECULAR: NEGATIVE
UROBILINOGEN UR STRIP-ACNC: 0.2 EU/DL (ref 0–1)
WBC OTHER # BLD: 12.2 K/UL (ref 4.5–11.5)

## 2023-07-26 PROCEDURE — 87502 INFLUENZA DNA AMP PROBE: CPT

## 2023-07-26 PROCEDURE — 81003 URINALYSIS AUTO W/O SCOPE: CPT

## 2023-07-26 PROCEDURE — 85027 COMPLETE CBC AUTOMATED: CPT

## 2023-07-26 PROCEDURE — 80048 BASIC METABOLIC PNL TOTAL CA: CPT

## 2023-07-26 PROCEDURE — 87040 BLOOD CULTURE FOR BACTERIA: CPT

## 2023-07-26 PROCEDURE — 99283 EMERGENCY DEPT VISIT LOW MDM: CPT

## 2023-07-26 PROCEDURE — 87635 SARS-COV-2 COVID-19 AMP PRB: CPT

## 2023-07-26 RX ORDER — FLUCONAZOLE 100 MG/1
100 TABLET ORAL DAILY
Qty: 14 TABLET | Refills: 0 | Status: SHIPPED | OUTPATIENT
Start: 2023-07-26 | End: 2023-08-09

## 2023-07-26 ASSESSMENT — LIFESTYLE VARIABLES
HOW MANY STANDARD DRINKS CONTAINING ALCOHOL DO YOU HAVE ON A TYPICAL DAY: PATIENT DOES NOT DRINK
HOW OFTEN DO YOU HAVE A DRINK CONTAINING ALCOHOL: NEVER

## 2023-07-26 ASSESSMENT — PAIN DESCRIPTION - LOCATION: LOCATION: THROAT

## 2023-07-26 ASSESSMENT — PAIN SCALES - GENERAL: PAINLEVEL_OUTOF10: 2

## 2023-07-26 ASSESSMENT — PAIN DESCRIPTION - PAIN TYPE: TYPE: ACUTE PAIN

## 2023-07-26 NOTE — ED PROVIDER NOTES
patient to be sitting in bed comfortably. Mild thrush on the back of the tongue. There is erythema of the posterior pharynx. No thrush appreciated in the oropharynx. Handling secretions without difficulty. No pharyngeal edema. No anterior cervical lymphadenopathy. My plan: Symptomatic and supportive care. Appropriate labs    Electronically signed by Ismael Alvarez DO on 7/26/23 at 10:33 AM EDT       [MS]   [id number] The patient has normal lab work. Strep swab was negative, COVID swabs are negative. BMP, CBC are normal except for slight leukocytosis 12.2. Patient is feeling better right now. No chest pain or shortness of breath. Patient currently on culture result, states that he has nystatin swish and spit, however is not picked up the prescription yet. Advised patient to continue current medication and use statin swish and spit as needed. Return precautions are given, we will follow some blood cultures if they are positive the patient is ready for discharge. [AH]      ED Course User Index  [AH] Marciano Barbour MD  [MS] Ismael Alvarez DO       No EKG obtained during this patient's visit. Chart review: Patient followed Dr. Joi El from ENT on 07/13/2023 for dysfunction of left eustachian tube    Social determinants: Patient is follow with PCP, wife at bedside, does not require placement    Acute or chronic illness limiting or affecting care: History of valve replacement, oral thrush, concerns for disseminated infection therefore obtaining blood cultures    ------------------------------ RESULTS -----------------------------    Labs reviewed and interpreted by me:  Results for orders placed or performed during the hospital encounter of 07/26/23   Strep Screen Rapid    Specimen: Throat   Result Value Ref Range    Source . THROAT SWAB     Strep A, Molecular NEGATIVE NEGATIVE   Blood Culture 1    Specimen: Blood   Result Value Ref Range    Specimen Description . BLOOD NO SITE GIVEN     Special Requests

## 2023-07-30 LAB
MICROORGANISM SPEC CULT: NORMAL
MICROORGANISM SPEC CULT: NORMAL
SERVICE CMNT-IMP: NORMAL
SERVICE CMNT-IMP: NORMAL
SPECIMEN DESCRIPTION: NORMAL
SPECIMEN DESCRIPTION: NORMAL

## 2024-02-06 DIAGNOSIS — I73.9 PAD (PERIPHERAL ARTERY DISEASE) (CMS-HCC): Primary | ICD-10-CM

## 2024-04-02 ENCOUNTER — APPOINTMENT (OUTPATIENT)
Dept: CARDIOLOGY | Facility: CLINIC | Age: 84
End: 2024-04-02
Payer: MEDICARE

## 2024-04-02 ENCOUNTER — APPOINTMENT (OUTPATIENT)
Dept: VASCULAR MEDICINE | Facility: HOSPITAL | Age: 84
End: 2024-04-02
Payer: MEDICARE

## 2024-04-09 ENCOUNTER — APPOINTMENT (OUTPATIENT)
Dept: CARDIOLOGY | Facility: CLINIC | Age: 84
End: 2024-04-09
Payer: MEDICARE

## 2024-04-09 ENCOUNTER — APPOINTMENT (OUTPATIENT)
Dept: VASCULAR MEDICINE | Facility: HOSPITAL | Age: 84
End: 2024-04-09
Payer: MEDICARE

## 2024-05-03 NOTE — PROGRESS NOTES
Referred by Dr. Handy, [facility], DO provider found for No chief complaint on file.       History of Present Illness  Odilon Khan is a 83 y.o. year old male with PMH of aortic aneurysm (2.7 cm- 2019, recommended repeat in 5 years), CAD, s/p PCI to left main into left circ and SVG to distal RCA on 2/6/2018, CABG with both RITO and one SVG graft in 1996, moderate AS and PAD with claudication, no previous intervention. Pt was last seen in 9/20.  His PVR with exercise done on 9/24/20 showed significant disease: Rt 0.75->0.31 post exercise, Lt 0.64->0.27 post, no interventions were warranted at that time as the symptoms were mild.   The echo showed mod-severe AS with Peak gradient 57 and Mean gradient 33.  Per scanned CTA of the AAA from Nov 2019, the infrarenal AAA was measuring at 32 mm.    Today, Mr. Khan is doing very well with minimal symptoms. He has some claudication whenever he is doing his daily walking exercise, but otherwise experiences no claudication in his day-to-day activities. He has not had to alter his lifestyle because of claudications. Has not had any wounds on his feet.     Past Medical History  Past Medical History:   Diagnosis Date    Atherosclerotic heart disease of native coronary artery without angina pectoris 09/13/2018    Coronary artery stenosis    Other ventricular tachycardia (Multi) 01/25/2018    Ventricular tachycardia, nonsustained    Peripheral vascular disease, unspecified (CMS-HCC) 03/08/2018    Claudication    Personal history of other endocrine, nutritional and metabolic disease 01/25/2018    History of hyperlipidemia       Past Surgical History  No past surgical history on file.    Social History  Social History     Tobacco Use    Smoking status: Not on file    Smokeless tobacco: Not on file   Substance Use Topics    Alcohol use: Not on file    Drug use: Not on file       Family History   No family history on file.    Review of Systems  As per HPI, all other systems reviewed  and negative.    Allergies:  Not on File     Outpatient Medications:  No current outpatient medications      Vitals:  There were no vitals filed for this visit.    Physical Exam:  Physical Exam  Vitals and nursing note reviewed.   Constitutional:       General: He is awake. He is not in acute distress.     Appearance: Normal appearance. He is well-developed and normal weight.   HENT:      Head: Normocephalic.      Right Ear: Hearing normal.      Mouth/Throat:      Mouth: Mucous membranes are moist.   Eyes:      General: Vision grossly intact. No scleral icterus.  Neck:      Vascular: No JVD.      Trachea: No tracheal deviation.   Cardiovascular:      Rate and Rhythm: Normal rate and regular rhythm.      Pulses:           Carotid pulses are 2+ on the right side and 2+ on the left side.       Radial pulses are 2+ on the right side and 2+ on the left side.        Dorsalis pedis pulses are detected w/ Doppler on the right side and detected w/ Doppler on the left side.        Posterior tibial pulses are detected w/ Doppler on the right side and detected w/ Doppler on the left side.   Pulmonary:      Effort: Pulmonary effort is normal.   Chest:      Chest wall: No deformity.   Abdominal:      General: Abdomen is protuberant. There is no distension.   Musculoskeletal:      Cervical back: Full passive range of motion without pain.      Right lower leg: No edema.      Left lower leg: No edema.   Skin:     General: Skin is warm.      Capillary Refill: Capillary refill takes less than 2 seconds.      Findings: No wound.   Neurological:      General: No focal deficit present.      Mental Status: He is alert and oriented to person, place, and time.      Cranial Nerves: Cranial nerves 2-12 are intact.      Sensory: Sensation is intact.      Motor: Motor function is intact.   Psychiatric:         Mood and Affect: Mood and affect normal.           Reviewed Study(s):    Cardiac Studies  Echo: No results found for this or any  previous visit.  Angiogram:     Vascular Studies  AVILA/PVR with Exercise (May 7, 2024):  Right Lower PVR: There is evidence of moderate disease at the femoral popliteal level. Hemodynamics are further compromised in the right lower extremity with exercise. Decreased digital perfusion noted. Biphasic flow is noted in the right posterior tibial artery and right dorsalis pedis artery. Triphasic flow is noted in the right common femoral artery and right popliteal artery.  Left Lower PVR: There is evidence of moderate disease at the femoral popliteal level. Hemodynamics are further compromised in the left lower extremity with exercise. Decreased digital perfusion noted. Biphasic flow is noted in the left dorsalis pedis artery and left posterior tibial artery. Triphasic flow is noted in the left common femoral artery and left popliteal artery.  Exercise:Exercise completed at 0% grade and 2 1/2 miles per hour for 5 minutes.     Imaging & Doppler Findings:     RIGHT Lower PVR                Pressures Ratios  Right High Thigh               154 mmHg  1.03  Right Low Thigh                111 mmHg  0.74  Right Calf                     106 mmHg  0.71  Right Posterior Tibial (Ankle) 94 mmHg   0.63  Right Dorsalis Pedis (Ankle)   91 mmHg   0.61  Right Digit (Great Toe)        75 mmHg   0.50     Right Ankle Post Exercise      73 mmHg   0.46        LEFT Lower PVR                Pressures Ratios  Left High Thigh               178 mmHg  1.19  Left Low Thigh                120 mmHg  0.81  Left Calf                     104 mmHg  0.70  Left Posterior Tibial (Ankle) 78 mmHg   0.52  Left Dorsalis Pedis (Ankle)   79 mmHg   0.53  Left Digit (Great Toe)        45 mmHg   0.30     Left Ankle Post Exercise      65 mmHg   0.41                           Right     Left  Brachial Pressure 149 mmHg 142 mmHg    Assessment/Plan   Mr. Khan is a pleasant 83-year-old gentleman who presents to clinic for routine follow-up of his peripheral arterial disease  with mild claudication symptoms with supervised exercise only, asymptomatic with day-to-day activities.     Plan:   Continue to take Plavix 75 mg daily.  Continue with daily exercise therapy.  Discussed signs and symptoms of progression of PAD, including worsened claudication, rest pain, or development of wounds, with the patient and his family.  Follow-up in one year with repeat AVILA with exercise    Dwayne Handy DO

## 2024-05-07 ENCOUNTER — HOSPITAL ENCOUNTER (OUTPATIENT)
Dept: VASCULAR MEDICINE | Facility: HOSPITAL | Age: 84
Discharge: HOME | End: 2024-05-07
Payer: MEDICARE

## 2024-05-07 ENCOUNTER — OFFICE VISIT (OUTPATIENT)
Dept: CARDIOLOGY | Facility: CLINIC | Age: 84
End: 2024-05-07
Payer: MEDICARE

## 2024-05-07 VITALS
DIASTOLIC BLOOD PRESSURE: 57 MMHG | WEIGHT: 184 LBS | SYSTOLIC BLOOD PRESSURE: 159 MMHG | HEART RATE: 50 BPM | HEIGHT: 72 IN | BODY MASS INDEX: 24.92 KG/M2

## 2024-05-07 DIAGNOSIS — I73.9 PERIPHERAL ARTERIAL DISEASE (CMS-HCC): Primary | ICD-10-CM

## 2024-05-07 DIAGNOSIS — I73.9 PAD (PERIPHERAL ARTERY DISEASE) (CMS-HCC): ICD-10-CM
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
## 2024-05-07 PROBLEM — E44.1 MALNUTRITION OF MILD DEGREE (MULTI): Status: ACTIVE | Noted: 2021-09-14

## 2024-05-07 PROBLEM — I25.10 ARTERIOSCLEROSIS OF CORONARY ARTERY: Status: ACTIVE | Noted: 2020-02-08

## 2024-05-07 PROBLEM — E11.9 TYPE 2 DIABETES MELLITUS WITHOUT COMPLICATION, WITHOUT LONG-TERM CURRENT USE OF INSULIN (MULTI): Status: ACTIVE | Noted: 2019-02-26

## 2024-05-07 PROBLEM — E78.5 HLD (HYPERLIPIDEMIA): Status: ACTIVE | Noted: 2024-05-07

## 2024-05-07 PROBLEM — I49.01 VENTRICULAR FIBRILLATION (MULTI): Status: ACTIVE | Noted: 2021-09-05

## 2024-05-07 PROBLEM — S06.5XAA SDH (SUBDURAL HEMATOMA) (MULTI): Status: ACTIVE | Noted: 2020-02-07

## 2024-05-07 PROBLEM — I71.40 AAA (ABDOMINAL AORTIC ANEURYSM) WITHOUT RUPTURE (CMS-HCC): Status: ACTIVE | Noted: 2024-05-07

## 2024-05-07 PROBLEM — S06.6XAA TRAUMATIC SUBARACHNOID HEMORRHAGE (MULTI): Status: ACTIVE | Noted: 2019-11-20

## 2024-05-07 PROBLEM — E87.6 HYPOKALEMIA: Status: ACTIVE | Noted: 2021-09-16

## 2024-05-07 PROBLEM — R56.1 SEIZURE AFTER HEAD INJURY (MULTI): Status: ACTIVE | Noted: 2020-02-08

## 2024-05-07 PROBLEM — K56.0 PARALYTIC ILEUS (MULTI): Status: ACTIVE | Noted: 2021-09-16

## 2024-05-07 PROBLEM — I35.0 NONRHEUMATIC AORTIC VALVE STENOSIS: Status: ACTIVE | Noted: 2021-09-16

## 2024-05-07 PROCEDURE — 93924 LWR XTR VASC STDY BILAT: CPT

## 2024-05-07 PROCEDURE — 93924 LWR XTR VASC STDY BILAT: CPT | Performed by: SURGERY

## 2024-05-07 PROCEDURE — 1159F MED LIST DOCD IN RCRD: CPT | Performed by: INTERNAL MEDICINE

## 2024-05-07 PROCEDURE — 99205 OFFICE O/P NEW HI 60 MIN: CPT | Performed by: INTERNAL MEDICINE

## 2024-05-07 RX ORDER — LORATADINE 10 MG/1
10 TABLET ORAL DAILY
COMMUNITY
Start: 2023-07-24

## 2024-05-07 RX ORDER — METOPROLOL SUCCINATE 25 MG/1
25 TABLET, EXTENDED RELEASE ORAL DAILY
COMMUNITY
Start: 2023-11-29

## 2024-05-07 RX ORDER — BLOOD SUGAR DIAGNOSTIC
STRIP MISCELLANEOUS
COMMUNITY
Start: 2024-05-03

## 2024-05-07 RX ORDER — AMLODIPINE BESYLATE 5 MG/1
5 TABLET ORAL DAILY
COMMUNITY
Start: 2024-03-28

## 2024-05-07 RX ORDER — FLUTICASONE PROPIONATE 50 MCG
2 SPRAY, SUSPENSION (ML) NASAL DAILY PRN
COMMUNITY
Start: 2023-07-13

## 2024-05-07 RX ORDER — NITROGLYCERIN 0.6 MG/1
0.6 TABLET SUBLINGUAL EVERY 5 MIN PRN
COMMUNITY

## 2024-05-07 RX ORDER — ROSUVASTATIN CALCIUM 20 MG/1
20 TABLET, COATED ORAL DAILY
COMMUNITY
Start: 2024-05-03

## 2024-05-07 RX ORDER — LANCETS 33 GAUGE
EACH MISCELLANEOUS
COMMUNITY
Start: 2024-05-03

## 2024-05-07 RX ORDER — ASCORBIC ACID 500 MG
500 TABLET ORAL DAILY
COMMUNITY

## 2024-05-07 RX ORDER — ACETAMINOPHEN 500 MG
5000 TABLET ORAL DAILY
COMMUNITY

## 2024-05-07 RX ORDER — EVENING PRIMROSE OIL 500 MG
100 CAPSULE ORAL DAILY
COMMUNITY

## 2024-05-07 RX ORDER — AZELASTINE 1 MG/ML
2 SPRAY, METERED NASAL 2 TIMES DAILY PRN
COMMUNITY
Start: 2023-07-13

## 2024-05-07 RX ORDER — CLOPIDOGREL BISULFATE 75 MG/1
1 TABLET ORAL DAILY
COMMUNITY

## 2024-05-07 ASSESSMENT — VISUAL ACUITY: OU: 1

## 2024-05-07 NOTE — PATIENT INSTRUCTIONS
You were seen today for routine follow-up of your peripheral arterial disease. Everything seems to be going well and you are overall asymptomatic with limited claudications symptoms that are not impeding your daily activities.    Your ABIs today showed the right of 0.63 and left was 0.53. With exercise the right drops to 0.46 and the left to 0.41.    Continue taking your Plavix as prescribed.  Follow-up in one year with repeat AVILA with exercise.   Notify us if there are any changes in your symptoms or if you develop any wounds on your feet.

## 2024-06-04 ENCOUNTER — APPOINTMENT (OUTPATIENT)
Dept: CARDIOLOGY | Facility: CLINIC | Age: 84
End: 2024-06-04
Payer: MEDICARE

## 2024-08-25 ENCOUNTER — HOSPITAL ENCOUNTER (EMERGENCY)
Age: 84
Discharge: HOME | End: 2024-08-25
Attending: EMERGENCY MEDICINE
Payer: MEDICARE

## 2024-08-25 ENCOUNTER — APPOINTMENT (OUTPATIENT)
Dept: GENERAL RADIOLOGY | Age: 84
End: 2024-08-25
Payer: MEDICARE

## 2024-08-25 VITALS
TEMPERATURE: 98.5 F | HEART RATE: 51 BPM | RESPIRATION RATE: 17 BRPM | DIASTOLIC BLOOD PRESSURE: 66 MMHG | OXYGEN SATURATION: 99 % | SYSTOLIC BLOOD PRESSURE: 143 MMHG

## 2024-08-25 DIAGNOSIS — R42 LIGHTHEADEDNESS: Primary | ICD-10-CM

## 2024-08-25 LAB
ALBUMIN SERPL-MCNC: 4.9 G/DL (ref 3.5–5.2)
ALP SERPL-CCNC: 39 U/L (ref 40–129)
ALT SERPL-CCNC: 14 U/L (ref 0–40)
ANION GAP SERPL CALCULATED.3IONS-SCNC: 16 MMOL/L (ref 7–16)
AST SERPL-CCNC: 23 U/L (ref 0–39)
BASOPHILS # BLD: 0.07 K/UL (ref 0–0.2)
BASOPHILS NFR BLD: 1 % (ref 0–2)
BILIRUB SERPL-MCNC: 0.5 MG/DL (ref 0–1.2)
BNP SERPL-MCNC: 266 PG/ML (ref 0–450)
BUN SERPL-MCNC: 17 MG/DL (ref 6–23)
CALCIUM SERPL-MCNC: 9.5 MG/DL (ref 8.6–10.2)
CHLORIDE SERPL-SCNC: 100 MMOL/L (ref 98–107)
CO2 SERPL-SCNC: 24 MMOL/L (ref 22–29)
CREAT SERPL-MCNC: 1 MG/DL (ref 0.7–1.2)
EOSINOPHIL # BLD: 0.34 K/UL (ref 0.05–0.5)
EOSINOPHILS RELATIVE PERCENT: 4 % (ref 0–6)
ERYTHROCYTE [DISTWIDTH] IN BLOOD BY AUTOMATED COUNT: 12 % (ref 11.5–15)
GFR, ESTIMATED: 79 ML/MIN/1.73M2
GLUCOSE SERPL-MCNC: 101 MG/DL (ref 74–99)
HCT VFR BLD AUTO: 41.4 % (ref 37–54)
HGB BLD-MCNC: 14.4 G/DL (ref 12.5–16.5)
IMM GRANULOCYTES # BLD AUTO: 0.03 K/UL (ref 0–0.58)
IMM GRANULOCYTES NFR BLD: 0 % (ref 0–5)
INR PPP: 1
LYMPHOCYTES NFR BLD: 2.76 K/UL (ref 1.5–4)
LYMPHOCYTES RELATIVE PERCENT: 30 % (ref 20–42)
MAGNESIUM SERPL-MCNC: 2.1 MG/DL (ref 1.6–2.6)
MCH RBC QN AUTO: 32.5 PG (ref 26–35)
MCHC RBC AUTO-ENTMCNC: 34.8 G/DL (ref 32–34.5)
MCV RBC AUTO: 93.5 FL (ref 80–99.9)
MONOCYTES NFR BLD: 1.1 K/UL (ref 0.1–0.95)
MONOCYTES NFR BLD: 12 % (ref 2–12)
NEUTROPHILS NFR BLD: 54 % (ref 43–80)
NEUTS SEG NFR BLD: 5 K/UL (ref 1.8–7.3)
PARTIAL THROMBOPLASTIN TIME: 30 SEC (ref 24.5–35.1)
PLATELET # BLD AUTO: 158 K/UL (ref 130–450)
PMV BLD AUTO: 11.5 FL (ref 7–12)
POTASSIUM SERPL-SCNC: 4.3 MMOL/L (ref 3.5–5)
PROT SERPL-MCNC: 7.5 G/DL (ref 6.4–8.3)
PROTHROMBIN TIME: 11.3 SEC (ref 9.3–12.4)
RBC # BLD AUTO: 4.43 M/UL (ref 3.8–5.8)
SODIUM SERPL-SCNC: 140 MMOL/L (ref 132–146)
TROPONIN I SERPL HS-MCNC: 16 NG/L (ref 0–11)
TROPONIN I SERPL HS-MCNC: 17 NG/L (ref 0–11)
WBC OTHER # BLD: 9.3 K/UL (ref 4.5–11.5)

## 2024-08-25 PROCEDURE — 85730 THROMBOPLASTIN TIME PARTIAL: CPT

## 2024-08-25 PROCEDURE — 84484 ASSAY OF TROPONIN QUANT: CPT

## 2024-08-25 PROCEDURE — 83880 ASSAY OF NATRIURETIC PEPTIDE: CPT

## 2024-08-25 PROCEDURE — 71046 X-RAY EXAM CHEST 2 VIEWS: CPT

## 2024-08-25 PROCEDURE — 99285 EMERGENCY DEPT VISIT HI MDM: CPT

## 2024-08-25 PROCEDURE — 85025 COMPLETE CBC W/AUTO DIFF WBC: CPT

## 2024-08-25 PROCEDURE — 80053 COMPREHEN METABOLIC PANEL: CPT

## 2024-08-25 PROCEDURE — 2580000003 HC RX 258

## 2024-08-25 PROCEDURE — 83735 ASSAY OF MAGNESIUM: CPT

## 2024-08-25 PROCEDURE — 85610 PROTHROMBIN TIME: CPT

## 2024-08-25 PROCEDURE — 93005 ELECTROCARDIOGRAM TRACING: CPT | Performed by: EMERGENCY MEDICINE

## 2024-08-25 RX ORDER — 0.9 % SODIUM CHLORIDE 0.9 %
500 INTRAVENOUS SOLUTION INTRAVENOUS ONCE
Status: COMPLETED | OUTPATIENT
Start: 2024-08-25 | End: 2024-08-25

## 2024-08-25 RX ORDER — SODIUM CHLORIDE 9 MG/ML
INJECTION, SOLUTION INTRAVENOUS
Status: COMPLETED
Start: 2024-08-25 | End: 2024-08-25

## 2024-08-25 RX ADMIN — Medication 500 ML: at 20:04

## 2024-08-25 RX ADMIN — SODIUM CHLORIDE 500 ML: 9 INJECTION, SOLUTION INTRAVENOUS at 20:04

## 2024-08-25 ASSESSMENT — LIFESTYLE VARIABLES
HOW MANY STANDARD DRINKS CONTAINING ALCOHOL DO YOU HAVE ON A TYPICAL DAY: PATIENT DOES NOT DRINK
HOW MANY STANDARD DRINKS CONTAINING ALCOHOL DO YOU HAVE ON A TYPICAL DAY: PATIENT DOES NOT DRINK

## 2024-08-25 NOTE — ED PROVIDER NOTES
Chief complaint:  Lightheaded      HPI history provided by the patient  Patient is here with his wife, he has a history of lightheaded nursing appointments, has been having them 3-4 times a week for the last 6 months or so.  He had another episode today, was fairly outside working in the garden and felt lightheaded.  He feels a little nauseated with it.  He has not passed out however.  No headache.  No chest pain or palpitations or shortness of breath.  Does have a cardiac history.  Has had no recent traveling or surgeries and no leg pain or swelling.  No abdominal pain.  No vomiting.  No fevers or URI symptoms.      Review of Systems   Constitutional:  Negative for chills, diaphoresis, fatigue and fever.   HENT:  Negative for congestion and sore throat.    Respiratory:  Negative for cough, chest tightness, shortness of breath and wheezing.    Cardiovascular:  Negative for chest pain.   Gastrointestinal:  Positive for nausea. Negative for abdominal pain, diarrhea and vomiting.   Genitourinary:  Negative for dysuria, flank pain, frequency, hematuria and urgency.   Musculoskeletal:  Negative for arthralgias, back pain, gait problem, joint swelling, myalgias, neck pain and neck stiffness.   Skin:  Negative for rash and wound.   Neurological:  Positive for light-headedness. Negative for dizziness, seizures, syncope, weakness and headaches.   Hematological:  Negative for adenopathy.   All other systems reviewed and are negative.       Physical Exam  Vitals and nursing note reviewed.   Constitutional:       General: He is awake. He is not in acute distress.     Appearance: He is well-developed. He is not ill-appearing, toxic-appearing or diaphoretic.   HENT:      Head: Normocephalic and atraumatic.   Eyes:      General: No scleral icterus.     Extraocular Movements:      Right eye: No nystagmus.      Left eye: No nystagmus.      Pupils: Pupils are equal, round, and reactive to light.   Cardiovascular:      Rate and  Normal      ------------------------------------------ PROGRESS NOTES ------------------------------------------  I have spoken with the patient and discussed today’s results, in addition to providing specific details for the plan of care and counseling regarding the diagnosis and prognosis.  Their questions are answered at this time and they are agreeable with the plan. I discussed at length with them reasons for immediate return here for re evaluation. They will followup with primary care by calling their office tomorrow.      --------------------------------- ADDITIONAL PROVIDER NOTES ---------------------------------  At this time the patient is without objective evidence of an acute process requiring hospitalization or inpatient management.  They have remained hemodynamically stable throughout their entire ED visit and are stable for discharge with outpatient follow-up.     The plan has been discussed in detail and they are aware of the specific conditions for emergent return, as well as the importance of follow-up.      New Prescriptions    No medications on file       Diagnosis:  1. Lightheadedness        Disposition:  Patient's disposition: Discharge to family  Patient's condition is stable.         Prosper Merchant DO  08/25/24 [id number]

## 2024-08-26 ENCOUNTER — HOSPITAL ENCOUNTER (INPATIENT)
Age: 84
LOS: 3 days | Discharge: HOME | DRG: 310 | End: 2024-08-29
Attending: EMERGENCY MEDICINE | Admitting: FAMILY MEDICINE
Payer: MEDICARE

## 2024-08-26 DIAGNOSIS — R55 SYNCOPE AND COLLAPSE: ICD-10-CM

## 2024-08-26 DIAGNOSIS — R42 DIZZINESS: Primary | ICD-10-CM

## 2024-08-26 DIAGNOSIS — R00.1 BRADYCARDIA: ICD-10-CM

## 2024-08-26 DIAGNOSIS — R07.9 CHEST PAIN, UNSPECIFIED TYPE: ICD-10-CM

## 2024-08-26 LAB
ANION GAP SERPL CALCULATED.3IONS-SCNC: 13 MMOL/L (ref 7–16)
BILIRUB UR QL STRIP: NEGATIVE
BUN SERPL-MCNC: 17 MG/DL (ref 6–23)
CALCIUM SERPL-MCNC: 9.2 MG/DL (ref 8.6–10.2)
CHLORIDE SERPL-SCNC: 105 MMOL/L (ref 98–107)
CLARITY UR: CLEAR
CO2 SERPL-SCNC: 23 MMOL/L (ref 22–29)
COLOR UR: YELLOW
CREAT SERPL-MCNC: 0.9 MG/DL (ref 0.7–1.2)
EKG ATRIAL RATE: 63 BPM
EKG P AXIS: 5 DEGREES
EKG P-R INTERVAL: 152 MS
EKG Q-T INTERVAL: 434 MS
EKG QRS DURATION: 84 MS
EKG QTC CALCULATION (BAZETT): 444 MS
EKG R AXIS: 51 DEGREES
EKG T AXIS: 25 DEGREES
EKG VENTRICULAR RATE: 63 BPM
ERYTHROCYTE [DISTWIDTH] IN BLOOD BY AUTOMATED COUNT: 12.3 % (ref 11.5–15)
GFR, ESTIMATED: 84 ML/MIN/1.73M2
GLUCOSE SERPL-MCNC: 105 MG/DL (ref 74–99)
GLUCOSE UR STRIP-MCNC: NEGATIVE MG/DL
HCT VFR BLD AUTO: 41.3 % (ref 37–54)
HGB BLD-MCNC: 13.8 G/DL (ref 12.5–16.5)
HGB UR QL STRIP.AUTO: NEGATIVE
KETONES UR STRIP-MCNC: NEGATIVE MG/DL
LEUKOCYTE ESTERASE UR QL STRIP: NEGATIVE
MCH RBC QN AUTO: 31.1 PG (ref 26–35)
MCHC RBC AUTO-ENTMCNC: 33.4 G/DL (ref 32–34.5)
MCV RBC AUTO: 93 FL (ref 80–99.9)
NITRITE UR QL STRIP: NEGATIVE
PH UR STRIP: 7.5 [PH] (ref 5–9)
PLATELET # BLD AUTO: 142 K/UL (ref 130–450)
PMV BLD AUTO: 11.3 FL (ref 7–12)
POTASSIUM SERPL-SCNC: 4.4 MMOL/L (ref 3.5–5)
PROT UR STRIP-MCNC: NEGATIVE MG/DL
RBC # BLD AUTO: 4.44 M/UL (ref 3.8–5.8)
RBC #/AREA URNS HPF: NORMAL /HPF
SODIUM SERPL-SCNC: 141 MMOL/L (ref 132–146)
SP GR UR STRIP: 1.02 (ref 1–1.03)
UROBILINOGEN UR STRIP-ACNC: 0.2 EU/DL (ref 0–1)
WBC #/AREA URNS HPF: NORMAL /HPF
WBC OTHER # BLD: 7.7 K/UL (ref 4.5–11.5)

## 2024-08-26 PROCEDURE — 81001 URINALYSIS AUTO W/SCOPE: CPT

## 2024-08-26 PROCEDURE — 2060000000 HC ICU INTERMEDIATE R&B

## 2024-08-26 PROCEDURE — 99285 EMERGENCY DEPT VISIT HI MDM: CPT

## 2024-08-26 PROCEDURE — 93005 ELECTROCARDIOGRAM TRACING: CPT

## 2024-08-26 PROCEDURE — 93010 ELECTROCARDIOGRAM REPORT: CPT | Performed by: INTERNAL MEDICINE

## 2024-08-26 PROCEDURE — 36415 COLL VENOUS BLD VENIPUNCTURE: CPT

## 2024-08-26 PROCEDURE — 85027 COMPLETE CBC AUTOMATED: CPT

## 2024-08-26 PROCEDURE — 80048 BASIC METABOLIC PNL TOTAL CA: CPT

## 2024-08-26 RX ORDER — ONDANSETRON 4 MG/1
4 TABLET, ORALLY DISINTEGRATING ORAL EVERY 8 HOURS PRN
Status: CANCELLED | OUTPATIENT
Start: 2024-08-26

## 2024-08-26 ASSESSMENT — PAIN - FUNCTIONAL ASSESSMENT: PAIN_FUNCTIONAL_ASSESSMENT: NONE - DENIES PAIN

## 2024-08-26 ASSESSMENT — ENCOUNTER SYMPTOMS
ABDOMINAL PAIN: 0
VOMITING: 0
CHEST TIGHTNESS: 0
SHORTNESS OF BREATH: 0
ABDOMINAL DISTENTION: 0
DIARRHEA: 0

## 2024-08-26 NOTE — ED PROVIDER NOTES
Chief Complaint   Patient presents with    Dizziness     Pt reports dizziness and sob at Dr Stauffer office     83 y.o. year old male presenting to the emergency room with concerns of dizziness associated with bradycardia. He had similar symptoms yesterday and went to the [facility] ED. Workup was unremarkable and he was sent home. He scheduled an appointment with Dr. López today and had similar symptoms of dizziness with bradycardia and presented to the [facility] ED. He states that drinking coffee makes his dizziness worse sometimes and nothing relieves it. Symptoms occur on and off.  He reports he didn't take his metoprolol yesterday given bradycardia. He denies any chest pain, SOB, LOC, N/V, or diarrhea. He denies any systemic signs of infection.   Reports that symptom's onset to be since past couple of months.     Review of Systems   Constitutional:  Negative for activity change, appetite change, chills, diaphoresis, fever and unexpected weight change.   Respiratory:  Negative for chest tightness and shortness of breath.    Cardiovascular:  Negative for chest pain and palpitations.   Gastrointestinal:  Negative for abdominal distention, abdominal pain, diarrhea and vomiting.   Endocrine: Negative for polyuria.   Genitourinary:  Negative for decreased urine volume and dysuria.   Neurological:  Positive for dizziness and light-headedness. Negative for seizures and syncope.        Physical Exam  Constitutional:       Appearance: Normal appearance.   HENT:      Head: Normocephalic and atraumatic.   Cardiovascular:      Rate and Rhythm: Regular rhythm. Bradycardia present.      Pulses: Normal pulses.      Heart sounds: Normal heart sounds. No murmur heard.  Pulmonary:      Effort: No respiratory distress.      Breath sounds: Normal breath sounds. No stridor. No wheezing.   Abdominal:      General: Bowel sounds are normal. There is no distension.      Palpations: Abdomen is soft.   Neurological:      General:

## 2024-08-27 LAB
ANION GAP SERPL CALCULATED.3IONS-SCNC: 13 MMOL/L (ref 7–16)
BASOPHILS # BLD: 0.05 K/UL (ref 0–0.2)
BASOPHILS NFR BLD: 1 % (ref 0–2)
BUN SERPL-MCNC: 13 MG/DL (ref 6–23)
CALCIUM SERPL-MCNC: 8.8 MG/DL (ref 8.6–10.2)
CHLORIDE SERPL-SCNC: 103 MMOL/L (ref 98–107)
CO2 SERPL-SCNC: 23 MMOL/L (ref 22–29)
CREAT SERPL-MCNC: 0.9 MG/DL (ref 0.7–1.2)
EKG ATRIAL RATE: 55 BPM
EKG P AXIS: 22 DEGREES
EKG P-R INTERVAL: 166 MS
EKG Q-T INTERVAL: 450 MS
EKG QRS DURATION: 100 MS
EKG QTC CALCULATION (BAZETT): 430 MS
EKG R AXIS: 47 DEGREES
EKG T AXIS: 78 DEGREES
EKG VENTRICULAR RATE: 55 BPM
EOSINOPHIL # BLD: 0.2 K/UL (ref 0.05–0.5)
EOSINOPHILS RELATIVE PERCENT: 3 % (ref 0–6)
ERYTHROCYTE [DISTWIDTH] IN BLOOD BY AUTOMATED COUNT: 12.2 % (ref 11.5–15)
GFR, ESTIMATED: 86 ML/MIN/1.73M2
GLUCOSE BLD-MCNC: 168 MG/DL (ref 74–99)
GLUCOSE SERPL-MCNC: 161 MG/DL (ref 74–99)
HCT VFR BLD AUTO: 43 % (ref 37–54)
HGB BLD-MCNC: 14.7 G/DL (ref 12.5–16.5)
IMM GRANULOCYTES # BLD AUTO: 0.03 K/UL (ref 0–0.58)
IMM GRANULOCYTES NFR BLD: 1 % (ref 0–5)
LYMPHOCYTES NFR BLD: 1.65 K/UL (ref 1.5–4)
LYMPHOCYTES RELATIVE PERCENT: 25 % (ref 20–42)
MAGNESIUM SERPL-MCNC: 2.1 MG/DL (ref 1.6–2.6)
MCH RBC QN AUTO: 32.5 PG (ref 26–35)
MCHC RBC AUTO-ENTMCNC: 34.2 G/DL (ref 32–34.5)
MCV RBC AUTO: 95.1 FL (ref 80–99.9)
MONOCYTES NFR BLD: 0.56 K/UL (ref 0.1–0.95)
MONOCYTES NFR BLD: 8 % (ref 2–12)
NEUTROPHILS NFR BLD: 63 % (ref 43–80)
NEUTS SEG NFR BLD: 4.17 K/UL (ref 1.8–7.3)
PLATELET, FLUORESCENCE: 136 K/UL (ref 130–450)
PMV BLD AUTO: 11.3 FL (ref 7–12)
POTASSIUM SERPL-SCNC: 4.1 MMOL/L (ref 3.5–5)
RBC # BLD AUTO: 4.52 M/UL (ref 3.8–5.8)
SODIUM SERPL-SCNC: 139 MMOL/L (ref 132–146)
TSH SERPL DL<=0.05 MIU/L-ACNC: 2.58 UIU/ML (ref 0.27–4.2)
WBC OTHER # BLD: 6.7 K/UL (ref 4.5–11.5)

## 2024-08-27 PROCEDURE — 2580000003 HC RX 258: Performed by: FAMILY MEDICINE

## 2024-08-27 PROCEDURE — 93010 ELECTROCARDIOGRAM REPORT: CPT | Performed by: INTERNAL MEDICINE

## 2024-08-27 PROCEDURE — 84443 ASSAY THYROID STIM HORMONE: CPT

## 2024-08-27 PROCEDURE — 85025 COMPLETE CBC W/AUTO DIFF WBC: CPT

## 2024-08-27 PROCEDURE — 6370000000 HC RX 637 (ALT 250 FOR IP): Performed by: FAMILY MEDICINE

## 2024-08-27 PROCEDURE — 82962 GLUCOSE BLOOD TEST: CPT

## 2024-08-27 PROCEDURE — 99222 1ST HOSP IP/OBS MODERATE 55: CPT | Performed by: FAMILY MEDICINE

## 2024-08-27 PROCEDURE — 36415 COLL VENOUS BLD VENIPUNCTURE: CPT

## 2024-08-27 PROCEDURE — 6360000002 HC RX W HCPCS: Performed by: FAMILY MEDICINE

## 2024-08-27 PROCEDURE — 83735 ASSAY OF MAGNESIUM: CPT

## 2024-08-27 PROCEDURE — 2060000000 HC ICU INTERMEDIATE R&B

## 2024-08-27 PROCEDURE — 80048 BASIC METABOLIC PNL TOTAL CA: CPT

## 2024-08-27 RX ORDER — SODIUM CHLORIDE 0.9 % (FLUSH) 0.9 %
5-40 SYRINGE (ML) INJECTION EVERY 12 HOURS SCHEDULED
Status: DISCONTINUED | OUTPATIENT
Start: 2024-08-27 | End: 2024-08-29 | Stop reason: HOSPADM

## 2024-08-27 RX ORDER — MULTIVIT WITH MINERALS/LUTEIN
1000 TABLET ORAL DAILY
COMMUNITY

## 2024-08-27 RX ORDER — POTASSIUM CHLORIDE 1500 MG/1
40 TABLET, EXTENDED RELEASE ORAL PRN
Status: DISCONTINUED | OUTPATIENT
Start: 2024-08-27 | End: 2024-08-29 | Stop reason: HOSPADM

## 2024-08-27 RX ORDER — AMLODIPINE BESYLATE 5 MG/1
5 TABLET ORAL DAILY
Status: DISCONTINUED | OUTPATIENT
Start: 2024-08-27 | End: 2024-08-29 | Stop reason: HOSPADM

## 2024-08-27 RX ORDER — POLYETHYLENE GLYCOL 3350 17 G/17G
17 POWDER, FOR SOLUTION ORAL DAILY PRN
Status: DISCONTINUED | OUTPATIENT
Start: 2024-08-27 | End: 2024-08-29 | Stop reason: HOSPADM

## 2024-08-27 RX ORDER — SODIUM CHLORIDE 9 MG/ML
INJECTION, SOLUTION INTRAVENOUS CONTINUOUS
Status: DISCONTINUED | OUTPATIENT
Start: 2024-08-27 | End: 2024-08-28

## 2024-08-27 RX ORDER — MAGNESIUM HYDROXIDE/ALUMINUM HYDROXICE/SIMETHICONE 120; 1200; 1200 MG/30ML; MG/30ML; MG/30ML
30 SUSPENSION ORAL EVERY 6 HOURS PRN
Status: DISCONTINUED | OUTPATIENT
Start: 2024-08-27 | End: 2024-08-29 | Stop reason: HOSPADM

## 2024-08-27 RX ORDER — PROMETHAZINE HYDROCHLORIDE 25 MG/ML
6.25 INJECTION, SOLUTION INTRAMUSCULAR; INTRAVENOUS EVERY 6 HOURS PRN
Status: CANCELLED | OUTPATIENT
Start: 2024-08-27

## 2024-08-27 RX ORDER — ENOXAPARIN SODIUM 100 MG/ML
40 INJECTION SUBCUTANEOUS DAILY
Status: DISCONTINUED | OUTPATIENT
Start: 2024-08-27 | End: 2024-08-29 | Stop reason: HOSPADM

## 2024-08-27 RX ORDER — SODIUM CHLORIDE 0.9 % (FLUSH) 0.9 %
5-40 SYRINGE (ML) INJECTION PRN
Status: DISCONTINUED | OUTPATIENT
Start: 2024-08-27 | End: 2024-08-29 | Stop reason: HOSPADM

## 2024-08-27 RX ORDER — CLOPIDOGREL BISULFATE 75 MG/1
75 TABLET ORAL DAILY
Status: DISCONTINUED | OUTPATIENT
Start: 2024-08-27 | End: 2024-08-29 | Stop reason: HOSPADM

## 2024-08-27 RX ORDER — ACETAMINOPHEN 650 MG/1
650 SUPPOSITORY RECTAL EVERY 6 HOURS PRN
Status: DISCONTINUED | OUTPATIENT
Start: 2024-08-27 | End: 2024-08-29 | Stop reason: HOSPADM

## 2024-08-27 RX ORDER — PROCHLORPERAZINE EDISYLATE 5 MG/ML
10 INJECTION INTRAMUSCULAR; INTRAVENOUS EVERY 6 HOURS PRN
Status: DISCONTINUED | OUTPATIENT
Start: 2024-08-27 | End: 2024-08-29 | Stop reason: HOSPADM

## 2024-08-27 RX ORDER — ROSUVASTATIN CALCIUM 20 MG/1
20 TABLET, COATED ORAL NIGHTLY
Status: DISCONTINUED | OUTPATIENT
Start: 2024-08-27 | End: 2024-08-29 | Stop reason: HOSPADM

## 2024-08-27 RX ORDER — POTASSIUM CHLORIDE 7.45 MG/ML
10 INJECTION INTRAVENOUS PRN
Status: DISCONTINUED | OUTPATIENT
Start: 2024-08-27 | End: 2024-08-29 | Stop reason: HOSPADM

## 2024-08-27 RX ORDER — ACETAMINOPHEN 325 MG/1
650 TABLET ORAL EVERY 6 HOURS PRN
Status: DISCONTINUED | OUTPATIENT
Start: 2024-08-27 | End: 2024-08-29 | Stop reason: HOSPADM

## 2024-08-27 RX ORDER — ONDANSETRON 2 MG/ML
4 INJECTION INTRAMUSCULAR; INTRAVENOUS EVERY 6 HOURS PRN
Status: DISCONTINUED | OUTPATIENT
Start: 2024-08-27 | End: 2024-08-29 | Stop reason: HOSPADM

## 2024-08-27 RX ORDER — SODIUM CHLORIDE 9 MG/ML
INJECTION, SOLUTION INTRAVENOUS PRN
Status: DISCONTINUED | OUTPATIENT
Start: 2024-08-27 | End: 2024-08-29 | Stop reason: HOSPADM

## 2024-08-27 RX ADMIN — SODIUM CHLORIDE, PRESERVATIVE FREE 10 ML: 5 INJECTION INTRAVENOUS at 20:46

## 2024-08-27 RX ADMIN — ROSUVASTATIN CALCIUM 20 MG: 20 TABLET, FILM COATED ORAL at 20:46

## 2024-08-27 RX ADMIN — AMLODIPINE BESYLATE 5 MG: 5 TABLET ORAL at 08:57

## 2024-08-27 RX ADMIN — SODIUM CHLORIDE: 9 INJECTION, SOLUTION INTRAVENOUS at 05:00

## 2024-08-27 RX ADMIN — SODIUM CHLORIDE: 9 INJECTION, SOLUTION INTRAVENOUS at 17:21

## 2024-08-27 RX ADMIN — CLOPIDOGREL BISULFATE 75 MG: 75 TABLET ORAL at 08:57

## 2024-08-27 NOTE — FLOWSHEET NOTE
08/27/24 1536   Vitals   Blood Pressure Lying 134/61   Pulse Lying 57 PER MINUTE   Blood Pressure Sitting 140/70   Pulse Sitting 68 PER MINUTE   Blood Pressure Standing 139/71   Pulse Standing 79 PER MINUTE

## 2024-08-27 NOTE — H&P
[facility] Hospitalist Group History and Physical          CHIEF COMPLAINT:  Dizziness     History of Present Illness:   83 year old male with past medical history of  CAD hyperlipidemia PAD PE TIA CABG  cardiac arrest . Patient is seen in ED due to dizziness . Patient was seen at [facility]  ED and  had workup for dizziness . Work up was unremarkable and patient was discharged to home . Patient reports 6 months of on and off dizziness  and presyncope. Daughter states that  patient has been in heart block with long pauses . On his way driving to Dr. Hutchinson  he became very dizzy and lightheaded . . They states that HR was in the 40s. Patient has not taken Lopressor for two days  Patient denies chest pain fever chills shortness of breath . In the ED patient was afebrile  patient was afebrile RR 16 HR 60 /68 99 % RA  Lab data reveals  sodium 141 potassium  4.4 BUN 17 Scr 0.9  WBC 7.7 HGB 13.8   UA negative   CXR negative  EKG revealed  NSR HR 63  no ischemic changes  17-16  . Patient will be admitted for  further evaluation         Informant(s) for H&P:daughter     REVIEW OF SYSTEMS:  A comprehensive review of systems was negative except for: what is in the HPI      PMH:  Past Medical History:   Diagnosis Date    Aneurysm of aorta (HCC)     CAD (coronary artery disease)     HLD (hyperlipidemia)     Hypertension     Neck pain     PAD (peripheral artery disease) (HCC)     Pulmonary emboli (HCC)     TIA (transient ischemic attack)     UTI (lower urinary tract infection)        Surgical History:  Past Surgical History:   Procedure Laterality Date    ANOMALOUS VENOUS RETURN REPAIR  2021    CARDIAC SURGERY      CAROTID STENT      CORONARY ARTERY BYPASS GRAFT      CRANIOTOMY Right 02/09/2020    CRANIOTOMY BRIANNA HOLES performed by Karolyn Peoples MD at [facility] OR    TONSILLECTOMY         Medications Prior to Admission:    Prior to Admission medications    Medication Sig Start Date End Date Taking? Authorizing  Provider   blood glucose test strips (ONETOUCH ULTRA) strip 1 each by In Vitro route daily As needed. 5/6/24   Mario Joseph DO   OneTouch Delica Lancets 33G MISC 1 each by Does not apply route daily 5/6/24   Mario Joseph DO   rosuvastatin (CRESTOR) 20 MG tablet Take 1 tablet by mouth nightly 5/3/24   Mario Joseph DO   docusate sodium (COLACE) 100 MG capsule  10/18/21   Deanne Garcia MD   amLODIPine (NORVASC) 5 MG tablet TAKE ONE TABLET BY MOUTH EVERY DAY 3/7/22   Deanne Garcia MD   clopidogrel (PLAVIX) 75 MG tablet Take 1 tablet by mouth daily 4/3/21   Deanne Garcia MD   Cholecalciferol (VITAMIN D3) 125 MCG (5000 UT) TABS Take by mouth    Deanne Garcia MD   metoprolol succinate (TOPROL XL) 25 MG extended release tablet Take 0.5 tablets by mouth daily    Deanne Garcia MD   Ascorbic Acid (VITAMIN C) 500 MG tablet Take 1 tablet by mouth daily    Deanne Garcia MD       Allergies:    Patient has no known allergies.    Social History:    reports that he quit smoking about 55 years ago. His smoking use included cigarettes. He started smoking about 75 years ago. He has a 20 pack-year smoking history. He has never used smokeless tobacco. He reports that he does not drink alcohol and does not use drugs.    Family History:   Reviewed       PHYSICAL EXAM:  Vitals:  BP (!) 146/49   Pulse 58   Temp 98.8 °F (37.1 °C) (Oral)   Resp 18   SpO2 98%     General Appearance: alert and oriented to person, place and time and in no acute distress  Skin: warm and dry  Head: normocephalic and atraumatic  Eyes: pupils equal, round, and reactive to light, extraocular eye movements intact, conjunctivae normal  Neck: neck supple and non tender without mass   Pulmonary/Chest: clear to auscultation bilaterally- no wheezes, rales or rhonchi, normal air movement, no respiratory distress  Cardiovascular: bradycardia , normal S1 and S2 and no carotid bruits  Abdomen: soft,

## 2024-08-27 NOTE — PROGRESS NOTES
Patient known to Dr Hutchinson will defer consult to him.Electronically signed by CADEN JAMA on 8/27/2024 at 6:34 AM

## 2024-08-27 NOTE — PLAN OF CARE
Problem: Discharge Planning  Goal: Discharge to home or other facility with appropriate resources  Outcome: Progressing  Flowsheets (Taken 8/27/2024 0922)  Discharge to home or other facility with appropriate resources: Identify barriers to discharge with patient and caregiver     Problem: Safety - Adult  Goal: Free from fall injury  Outcome: Progressing

## 2024-08-28 ENCOUNTER — APPOINTMENT (OUTPATIENT)
Age: 84
DRG: 310 | End: 2024-08-28
Attending: INTERNAL MEDICINE
Payer: MEDICARE

## 2024-08-28 LAB
ANION GAP SERPL CALCULATED.3IONS-SCNC: 11 MMOL/L (ref 7–16)
BASOPHILS # BLD: 0.07 K/UL (ref 0–0.2)
BASOPHILS NFR BLD: 1 % (ref 0–2)
BUN SERPL-MCNC: 14 MG/DL (ref 6–23)
CALCIUM SERPL-MCNC: 9 MG/DL (ref 8.6–10.2)
CHLORIDE SERPL-SCNC: 106 MMOL/L (ref 98–107)
CO2 SERPL-SCNC: 23 MMOL/L (ref 22–29)
CREAT SERPL-MCNC: 0.8 MG/DL (ref 0.7–1.2)
ECHO AR MAX VEL PISA: 4.4 M/S
ECHO AV AREA PEAK VELOCITY: 1.3 CM2
ECHO AV AREA VTI: 1.3 CM2
ECHO AV AREA/BSA PEAK VELOCITY: 0.6 CM2/M2
ECHO AV AREA/BSA VTI: 0.6 CM2/M2
ECHO AV MEAN GRADIENT: 13 MMHG
ECHO AV MEAN VELOCITY: 1.7 M/S
ECHO AV PEAK GRADIENT: 26 MMHG
ECHO AV PEAK VELOCITY: 2.6 M/S
ECHO AV REGURGITANT PHT: 625.8 MILLISECOND
ECHO AV VELOCITY RATIO: 0.42
ECHO AV VTI: 58.2 CM
ECHO EST RA PRESSURE: 3 MMHG
ECHO LA DIAMETER INDEX: 2.21 CM/M2
ECHO LA DIAMETER: 4.5 CM
ECHO LA VOL A-L A2C: 58 ML (ref 18–58)
ECHO LA VOL A-L A4C: 54 ML (ref 18–58)
ECHO LA VOL MOD A2C: 56 ML (ref 18–58)
ECHO LA VOL MOD A4C: 52 ML (ref 18–58)
ECHO LA VOLUME AREA LENGTH: 56 ML
ECHO LA VOLUME INDEX A-L A2C: 28 ML/M2 (ref 16–34)
ECHO LA VOLUME INDEX A-L A4C: 26 ML/M2 (ref 16–34)
ECHO LA VOLUME INDEX AREA LENGTH: 27 ML/M2 (ref 16–34)
ECHO LA VOLUME INDEX MOD A2C: 27 ML/M2 (ref 16–34)
ECHO LA VOLUME INDEX MOD A4C: 25 ML/M2 (ref 16–34)
ECHO LV EDV A2C: 96 ML
ECHO LV EDV A4C: 84 ML
ECHO LV EDV BP: 93 ML (ref 67–155)
ECHO LV EDV INDEX A4C: 41 ML/M2
ECHO LV EDV INDEX BP: 46 ML/M2
ECHO LV EDV NDEX A2C: 47 ML/M2
ECHO LV EF PHYSICIAN: 68 %
ECHO LV EJECTION FRACTION A2C: 73 %
ECHO LV EJECTION FRACTION A4C: 62 %
ECHO LV EJECTION FRACTION BIPLANE: 69 % (ref 55–100)
ECHO LV ESV A2C: 26 ML
ECHO LV ESV A4C: 32 ML
ECHO LV ESV BP: 29 ML (ref 22–58)
ECHO LV ESV INDEX A2C: 13 ML/M2
ECHO LV ESV INDEX A4C: 16 ML/M2
ECHO LV ESV INDEX BP: 14 ML/M2
ECHO LV FRACTIONAL SHORTENING: 26 % (ref 28–44)
ECHO LV INTERNAL DIMENSION DIASTOLE INDEX: 1.91 CM/M2
ECHO LV INTERNAL DIMENSION DIASTOLIC: 3.9 CM (ref 4.2–5.9)
ECHO LV INTERNAL DIMENSION SYSTOLIC INDEX: 1.42 CM/M2
ECHO LV INTERNAL DIMENSION SYSTOLIC: 2.9 CM
ECHO LV ISOVOLUMETRIC RELAXATION TIME (IVRT): 79.6 MS
ECHO LV IVSD: 1 CM (ref 0.6–1)
ECHO LV IVSS: 1.2 CM
ECHO LV MASS 2D: 131 G (ref 88–224)
ECHO LV MASS INDEX 2D: 64.2 G/M2 (ref 49–115)
ECHO LV POSTERIOR WALL DIASTOLIC: 1.1 CM (ref 0.6–1)
ECHO LV POSTERIOR WALL SYSTOLIC: 1.2 CM
ECHO LV RELATIVE WALL THICKNESS RATIO: 0.56
ECHO LVOT AREA: 3.1 CM2
ECHO LVOT AV VTI INDEX: 0.44
ECHO LVOT DIAM: 2 CM
ECHO LVOT MEAN GRADIENT: 2 MMHG
ECHO LVOT PEAK GRADIENT: 5 MMHG
ECHO LVOT PEAK VELOCITY: 1.1 M/S
ECHO LVOT STROKE VOLUME INDEX: 39.3 ML/M2
ECHO LVOT SV: 80.1 ML
ECHO LVOT VTI: 25.5 CM
ECHO MV "A" WAVE DURATION: 124.6 MSEC
ECHO MV A VELOCITY: 1.11 M/S
ECHO MV AREA PHT: 1.8 CM2
ECHO MV AREA VTI: 2.1 CM2
ECHO MV E DECELERATION TIME (DT): 277.2 MS
ECHO MV E VELOCITY: 0.85 M/S
ECHO MV E/A RATIO: 0.77
ECHO MV LVOT VTI INDEX: 1.46
ECHO MV MAX VELOCITY: 1.2 M/S
ECHO MV MEAN GRADIENT: 2 MMHG
ECHO MV MEAN VELOCITY: 0.7 M/S
ECHO MV PEAK GRADIENT: 6 MMHG
ECHO MV PRESSURE HALF TIME (PHT): 123.3 MS
ECHO MV VTI: 37.3 CM
ECHO PV MAX VELOCITY: 1.6 M/S
ECHO PV MEAN GRADIENT: 4 MMHG
ECHO PV MEAN VELOCITY: 0.9 M/S
ECHO PV PEAK GRADIENT: 10 MMHG
ECHO PV VTI: 28.7 CM
ECHO PVEIN A DURATION: 121.1 MS
ECHO PVEIN A VELOCITY: 0.3 M/S
ECHO PVEIN PEAK D VELOCITY: 0.4 M/S
ECHO PVEIN PEAK S VELOCITY: 0.5 M/S
ECHO PVEIN S/D RATIO: 1.3
ECHO RIGHT VENTRICULAR SYSTOLIC PRESSURE (RVSP): 28 MMHG
ECHO RV INTERNAL DIMENSION: 3.1 CM
ECHO TV REGURGITANT MAX VELOCITY: 2.51 M/S
ECHO TV REGURGITANT PEAK GRADIENT: 25 MMHG
EOSINOPHIL # BLD: 0.31 K/UL (ref 0.05–0.5)
EOSINOPHILS RELATIVE PERCENT: 4 % (ref 0–6)
ERYTHROCYTE [DISTWIDTH] IN BLOOD BY AUTOMATED COUNT: 12.3 % (ref 11.5–15)
GFR, ESTIMATED: 89 ML/MIN/1.73M2
GLUCOSE SERPL-MCNC: 133 MG/DL (ref 74–99)
HCT VFR BLD AUTO: 43 % (ref 37–54)
HGB BLD-MCNC: 14.6 G/DL (ref 12.5–16.5)
IMM GRANULOCYTES # BLD AUTO: 0.03 K/UL (ref 0–0.58)
IMM GRANULOCYTES NFR BLD: 0 % (ref 0–5)
LYMPHOCYTES NFR BLD: 2.2 K/UL (ref 1.5–4)
LYMPHOCYTES RELATIVE PERCENT: 26 % (ref 20–42)
MAGNESIUM SERPL-MCNC: 2 MG/DL (ref 1.6–2.6)
MCH RBC QN AUTO: 31.5 PG (ref 26–35)
MCHC RBC AUTO-ENTMCNC: 34 G/DL (ref 32–34.5)
MCV RBC AUTO: 92.9 FL (ref 80–99.9)
MONOCYTES NFR BLD: 0.96 K/UL (ref 0.1–0.95)
MONOCYTES NFR BLD: 11 % (ref 2–12)
NEUTROPHILS NFR BLD: 58 % (ref 43–80)
NEUTS SEG NFR BLD: 4.92 K/UL (ref 1.8–7.3)
PLATELET # BLD AUTO: 141 K/UL (ref 130–450)
PMV BLD AUTO: 11.1 FL (ref 7–12)
POTASSIUM SERPL-SCNC: 4.3 MMOL/L (ref 3.5–5)
RBC # BLD AUTO: 4.63 M/UL (ref 3.8–5.8)
SODIUM SERPL-SCNC: 140 MMOL/L (ref 132–146)
WBC OTHER # BLD: 8.5 K/UL (ref 4.5–11.5)

## 2024-08-28 PROCEDURE — 99232 SBSQ HOSP IP/OBS MODERATE 35: CPT | Performed by: INTERNAL MEDICINE

## 2024-08-28 PROCEDURE — 83735 ASSAY OF MAGNESIUM: CPT

## 2024-08-28 PROCEDURE — 97165 OT EVAL LOW COMPLEX 30 MIN: CPT

## 2024-08-28 PROCEDURE — 2580000003 HC RX 258: Performed by: FAMILY MEDICINE

## 2024-08-28 PROCEDURE — 97530 THERAPEUTIC ACTIVITIES: CPT

## 2024-08-28 PROCEDURE — 6360000002 HC RX W HCPCS: Performed by: FAMILY MEDICINE

## 2024-08-28 PROCEDURE — 93005 ELECTROCARDIOGRAM TRACING: CPT | Performed by: INTERNAL MEDICINE

## 2024-08-28 PROCEDURE — 36415 COLL VENOUS BLD VENIPUNCTURE: CPT

## 2024-08-28 PROCEDURE — 80048 BASIC METABOLIC PNL TOTAL CA: CPT

## 2024-08-28 PROCEDURE — 6370000000 HC RX 637 (ALT 250 FOR IP): Performed by: FAMILY MEDICINE

## 2024-08-28 PROCEDURE — 97535 SELF CARE MNGMENT TRAINING: CPT

## 2024-08-28 PROCEDURE — 85025 COMPLETE CBC W/AUTO DIFF WBC: CPT

## 2024-08-28 PROCEDURE — 97161 PT EVAL LOW COMPLEX 20 MIN: CPT

## 2024-08-28 PROCEDURE — 93306 TTE W/DOPPLER COMPLETE: CPT

## 2024-08-28 PROCEDURE — 2060000000 HC ICU INTERMEDIATE R&B

## 2024-08-28 RX ADMIN — ENOXAPARIN SODIUM 40 MG: 100 INJECTION SUBCUTANEOUS at 08:56

## 2024-08-28 RX ADMIN — AMLODIPINE BESYLATE 5 MG: 5 TABLET ORAL at 08:56

## 2024-08-28 RX ADMIN — ROSUVASTATIN CALCIUM 20 MG: 20 TABLET, FILM COATED ORAL at 21:09

## 2024-08-28 RX ADMIN — CLOPIDOGREL BISULFATE 75 MG: 75 TABLET ORAL at 08:56

## 2024-08-28 RX ADMIN — SODIUM CHLORIDE, PRESERVATIVE FREE 10 ML: 5 INJECTION INTRAVENOUS at 21:10

## 2024-08-28 NOTE — PROGRESS NOTES
[facility] Hospitalist Progress Note      Synopsis: Patient with a hx of PAD, HLD, DM2, SAH and SDH, CAD, seizure hx admitted on 8/26/2024 after presenting to the ED with dizziness. Recently discharged from [facility] ED with negative workup; in our ED pt was noted to be bradycardic, was therefore admitted for further evaluation and treatment of symptomatic bradycardia.     Subjective  Bradycardia resolved  Hypertensive     Exam:  BP (!) 153/66   Pulse 60   Temp 97.8 °F (36.6 °C) (Infrared)   Resp 14   Ht 1.829 m (6')   Wt 81.6 kg (180 lb)   SpO2 97%   BMI 24.41 kg/m²   General appearance: No apparent distress, appears stated age and cooperative.  HEENT: Pupils equal, round, and reactive to light. Conjunctivae/corneas clear.  Neck: Supple. No jugular venous distention. Trachea midline.  Respiratory:  Normal respiratory effort. Clear to auscultation, bilaterally without Rales/Wheezes/Rhonchi.  Cardiovascular: Regular rate and rhythm with normal S1/S2 without murmurs, rubs or gallops.  Abdomen: Soft, non-tender, non-distended with normal bowel sounds.  Musculoskeletal: No clubbing, cyanosis or edema bilaterally. Brisk capillary refill. 2+ lower extremity pulses (dorsalis pedis).   Skin:  No rashes    Neurologic: awake, alert and following commands     Medications:  Reviewed    Infusion Medications    sodium chloride 75 mL/hr at 08/27/24 1722    sodium chloride       Scheduled Medications    amLODIPine  5 mg Oral Daily    clopidogrel  75 mg Oral Daily    rosuvastatin  20 mg Oral Nightly    sodium chloride flush  5-40 mL IntraVENous 2 times per day    enoxaparin  40 mg SubCUTAneous Daily     PRN Meds: perflutren lipid microspheres, sodium chloride flush, sodium chloride, potassium chloride **OR** potassium alternative oral replacement **OR** potassium chloride, ondansetron, polyethylene glycol, acetaminophen **OR** acetaminophen, aluminum & magnesium hydroxide-simethicone, prochlorperazine    I/O    Intake/Output

## 2024-08-28 NOTE — CARE COORDINATION
Transition of Care: Met with Patient at bedside and explained case management role. Patient lives with wife in a [place] home with 2 steps to enter. No Hx DME, HHC, or SNF. Patient primary care physician is Mario Joseph DO. Patient uses [facility]. Primary decision maker is Joellen (wife). Discharge plan is Home with no home health care needs. Patient came to hospital with lightheadedness and nausea. Admitted for Syncope and Collapse. Had 30 beats V-Tach last night. Needs US carotids. CM/SW to follow. MT    Case Management Assessment  Initial Evaluation    Date/Time of Evaluation: 8/28/2024 10:15 AM  Assessment Completed by: Maximino Martines RN    If patient is discharged prior to next notation, then this note serves as note for discharge by case management.    Patient Name: Phillip Fields                   YOB: 1940  Diagnosis: Syncope and collapse [R55]  Dizziness [R42]  Bradycardia [R00.1]  Chest pain, unspecified type [R07.9]                   Date / Time: 8/26/2024  3:52 PM    Patient Admission Status: Inpatient   Readmission Risk (Low < 19, Mod (19-27), High > 27): Readmission Risk Score: 7.8    Current PCP: Mario Joseph DO  PCP verified by CM? Yes    Chart Reviewed: Yes      History Provided by: Patient  Patient Orientation: Alert and Oriented    Patient Cognition: Alert    Hospitalization in the last 30 days (Readmission):  No    If yes, Readmission Assessment in  Navigator will be completed.    Advance Directives:      Code Status: Full Code   Patient's Primary Decision Maker is: Legal Next of Kin      Discharge Planning:    Patient lives with: Spouse/Significant Other Type of Home: House  Primary Care Giver: Self  Patient Support Systems include: Spouse/Significant Other   Current Financial resources:    Current community resources:    Current services prior to admission: None            Current DME:              Type of Home Care services:   that supports the patient's individualized plan of care/goals and shares the quality data associated with the providers was provided to:     Patient Representative Name:       The Patient and/or Patient Representative Agree with the Discharge Plan?      Maximino Martines RN BSN  Case Management  [phone]

## 2024-08-28 NOTE — CONSULTS
CARDIOLOGY CONSULTATION    Patient Name:  Phillip Fields    :  1940    Reason for Consultation:   Near syncope history of coronary artery disease and stroke.    History of Present Illness:   [facility] recent onset of multiple near syncopal episodes for which she feels an irregular pulse.Apparently had a monitor device which apparently suggested transient severe bradycardia but documentation is not readily available presently.He has a longstanding history of coronary artery disease and underwent previous coronary artery bypass surgery.  He is very meticulous as is his wife and hisCardiopulmonary physical status.    Past Medical History:   has a past medical history of Aneurysm of aorta ([facility]), CAD (coronary artery disease), HLD (hyperlipidemia), Hypertension, Neck pain, PAD (peripheral artery disease) ([facility]), Pulmonary emboli ([facility]), TIA (transient ischemic attack), and UTI (lower urinary tract infection).    Surgical History:   has a past surgical history that includes Cardiac surgery; Coronary artery bypass graft; Tonsillectomy; craniotomy (Right, 2020); Carotid stent; and Anomalous venous return repair ().     Social History:   reports that he quit smoking about 55 years ago. His smoking use included cigarettes. He started smoking about 75 years ago. He has a 20 pack-year smoking history. He has never used smokeless tobacco. He reports that he does not drink alcohol and does not use drugs.     Family History:  family history is not on file.     Medications:  Prior to Admission medications    Medication Sig Start Date End Date Taking? Authorizing Provider   vitamin E 1000 units capsule Take 1 capsule by mouth daily   Yes Provider, MD Deanne   rosuvastatin (CRESTOR) 20 MG tablet Take 1 tablet by mouth nightly 5/3/24  Yes Mario Joseph DO   docusate sodium (COLACE) 100 MG capsule  10/18/21  Yes Provider  spent more than 55 minutes face to face with Phillip Fields,and reviewing notes and laboratory data with greater than 50% of this time instructing and counseling the patient And his daughter regarding my findings and recommendations and I have answered all questions as posed to me by Mr. Fields.And his daughter who is at his bedside Thank you, Mario Joseph DO for allowing me to consult in the care of this patient.    Brent Hutchinson,  , FACP, FACC, [facility]    NOTE:  This report was transcribed using voice recognition software.  Every effort was made to ensure accuracy; however, inadvertent computerized transcription errors may be present.

## 2024-08-28 NOTE — PROGRESS NOTES
4 Eyes Skin Assessment     NAME:  Phillip Fields  YOB: 1940  MEDICAL RECORD NUMBER:  [id number]    The patient is being assessed for  Admission    I agree that at least one RN has performed a thorough Head to Toe Skin Assessment on the patient. ALL assessment sites listed below have been assessed.      Areas assessed by both nurses:    Head, Face, Ears, Shoulders, Back, Chest, Arms, Elbows, Hands, Sacrum. Buttock, Coccyx, Ischium, and Legs. Feet and Heels        Does the Patient have a Wound? No noted wound(s)       Swapnil Prevention initiated by RN: Yes  Wound Care Orders initiated by RN: Yes    Pressure Injury (Stage 3,4, Unstageable, DTI, NWPT, and Complex wounds) if present, place Wound referral order by RN under : No    New Ostomies, if present place, Ostomy referral order under : Yes     Nurse 1 eSignature: Electronically signed by Jyotsna Wong RN on 8/27/24 at 8:47 PM EDT    **SHARE this note so that the co-signing nurse can place an eSignature**    Nurse 2 eSignature: Electronically signed by Aleyda Allen RN on 8/28/24 at 1:08 AM EDT

## 2024-08-28 NOTE — PLAN OF CARE
Problem: Discharge Planning  Goal: Discharge to home or other facility with appropriate resources  8/28/2024 1041 by Meera Springer, RN  Outcome: Progressing  8/28/2024 0019 by Jyotsna Wong, RN  Outcome: Progressing     Problem: Safety - Adult  Goal: Free from fall injury  8/28/2024 1041 by Meera Springer, RN  Outcome: Progressing  8/28/2024 0019 by Jyotsna Wong, RN  Outcome: Progressing

## 2024-08-28 NOTE — PROGRESS NOTES
Form completed, signed and faxed back to [phone].  Eleonora Morrison,      mobility safety.    Patient response to education:   Pt verbalized understanding Pt demonstrated skill Pt requires further education in this area   yes yes yes     ASSESSMENT:    Conditions Requiring Skilled Therapeutic Intervention:    [x]Decreased strength     []Decreased ROM  [x]Decreased functional mobility  [x]Decreased balance   [x]Decreased endurance   []Decreased posture  []Decreased sensation  []Decreased coordination   []Decreased vision  []Decreased safety awareness   []Increased pain       Comments:  Pt supine in bed upon entering, agreeable to participate. BP was taken while pt ws still in bed and notes as 180/73. Pt rolled to their side and then sat EOB with supervision. BP was taken when sitting and notes as 166/86. Pt performed sit to stand with SBA and BP was taken and noted as 159/82. Pt ambulated with SBA and no difficulties, slight rise in HR. Pt demonstrated good gait mechanics and gait speed. Returned to room. Pt had no complaints of dizziness, lightheaded, or SOB need during session. Pt left with call button in reach and lines attached.    Treatment:  Patient practiced and was instructed in the following treatment:    Bed mobility training - pt given verbal and tactile cues to facilitate proper sequencing and safety during rolling and supine>sit as well as provided with supervision to complete task   STS and pivot transfer training - pt educated on proper hand and foot placement and safety and sequencing to safely complete sit<>stand and pivot transfers with SBA to complete task safely   Gait training- pt was given verbal cues to facilitate postural correction during ambulation as well as provided with SBA.    Pt's/ family goals   1. Return to PLOF    Prognosis is good for reaching above PT goals.    Patient and or family understand(s) diagnosis, prognosis, and plan of care.  yes    PHYSICAL THERAPY PLAN OF CARE:    PT POC is established based on physician order and patient diagnosis      Referring provider/PT Order:  Yuli Knight MD   Diagnosis:  Syncope and collapse [R55]  Dizziness [R42]  Bradycardia [R00.1]  Chest pain, unspecified type [R07.9]  Specific instructions for next treatment:  Progress as tolerated    Current Treatment Recommendations:     [x] Strengthening to improve independence with functional mobility   [] ROM to improve independence with functional mobility   [x] Balance Training to improve static/dynamic balance and to reduce fall risk  [x] Endurance Training to improve activity tolerance during functional mobility   [x] Transfer Training to improve safety and independence with all functional transfers   [x] Gait Training to improve gait mechanics, endurance and assess need for appropriate assistive device  [x] Stair Training in preparation for safe discharge home and/or into the community   [] Positioning to prevent skin breakdown and contractures  [x] Safety and Education Training   [] Patient/Caregiver Education   [] HEP  [] Other     PT long term treatment goals are located in above grid    Frequency of treatments: 2-5x/week x 1-2 weeks.    Time in  0935  Time out  1000    Total Treatment Time 15 minutes     Evaluation Time includes thorough review of current medical information, gathering information on past medical history/social history and prior level of function, completion of standardized testing/informal observation of tasks, assessment of data and education on plan of care and goals.    CPT codes:  [x] Low Complexity PT evaluation 98133  [] Moderate Complexity PT evaluation 91237  [] High Complexity PT evaluation 81463  [] PT Re-evaluation 60529  [] Gait training 06211 -- minutes  [] Manual therapy 10814 -- minutes  [x] Therapeutic activities 46102 15 minutes  [] Therapeutic exercises 40454 -- minutes  [] Neuromuscular reeducation 81410 -- minutes     Jace Marcial, ALFRED Reid, PT, DPT  [id number]

## 2024-08-28 NOTE — PROGRESS NOTES
BP (!) 160/65   Pulse 58   Temp 97.4 °F (36.3 °C) (Temporal)   Resp 22   Ht 1.829 m (6')   Wt 81.6 kg (180 lb)   SpO2 100%   BMI 24.41 kg/m²   Patient Vitals for the past 96 hrs (Last 3 readings):   Weight   08/27/24 0756 81.6 kg (180 lb)     OBJECTIVE:    HEENT: PERRL, EOM  Intact; sclera non-icteric, conjunctiva pink. Carotids are brisk in upstroke with normal contour. No carotid bruits. Normal jugular venous pulsation at 45°. No palpable cervical nor supraclavicular nodes.Thyroid not palpable. Trachea midline.  Chest: Even excursion  Lungs: CTA B, no expiratory wheezes or rhonchi, no decreased tactile fremitus without inspiratory rales.  Heart: Regular  rhythm; S1 > S2, no gallop Grade 2/6 early systolic murmur heard LMSB. No clicks, rub, palpable thrills   or heaves. PMI nondisplaced, 5th intercostal space MCL.   Abdomen: Soft, nontender, nondistended,  mildly protuberant, no masses or organomegaly.  Bowel sounds active.  Extremities: Without clubbing, cyanosis or edema. Pulses present 3+ upper extermities bilaterally; present 1+ DP  bilaterally and barely palpable PT bilaterally.     Data:   Scheduled Meds: Reviewed  Continuous Infusions:    sodium chloride 75 mL/hr at 08/27/24 1722    sodium chloride         Intake/Output Summary (Last 24 hours) at 8/28/2024 0136  Last data filed at 8/27/2024 1722  Gross per 24 hour   Intake 1407.48 ml   Output --   Net 1407.48 ml     CBC:   Recent Labs     08/25/24 1945 08/26/24  1901 08/27/24  1051   WBC 9.3 7.7 6.7   HGB 14.4 13.8 14.7   HCT 41.4 41.3 43.0    142  --      BMP:  Recent Labs     08/25/24 1945 08/26/24  1901 08/27/24  1051    141 139   K 4.3 4.4 4.1    105 103   CO2 24 23 23   BUN 17 17 13   CREATININE 1.0 0.9 0.9   LABGLOM 79 84 86     ABGs: No results found for: \"PH\", \"PO2\", \"PCO2\"  INR:   Recent Labs     08/25/24 1945   INR 1.0     PRO-BNP:    Lab Results   Component Value Date    PROBNP 266 08/25/2024      TSH:   Lab Results   Component Value Date    TSH 2.58 08/27/2024      Cardiac Injury Profile:   Recent Labs     08/25/24  1945 08/25/24  2120   TROPHS 17* 16*      Lipid Profile:   Lab Results   Component Value Date/Time    TRIG 99 05/02/2024 10:01 AM    HDL 45 05/02/2024 10:01 AM    CHOL 119 05/02/2024 10:01 AM      Hemoglobin A1C: No components found for: \"HGBA1C\"      RAD:   No results found.      EKG: See Report  Echo: See Report      IMPRESSIONS:  Patient Active Problem List   Diagnosis Code    Leukocytosis D72.829    PAD (peripheral artery disease) ([facility]) I73.9    HLD (hyperlipidemia) E78.5    Type 2 diabetes mellitus without complication, without long-term current use of insulin ([facility]) E11.9    Traumatic subarachnoid hemorrhage ([facility]) S06.6XAA    SDH (subdural hematoma) ([facility]) S06.5XAA    SAH (subarachnoid hemorrhage) ([facility]) I60.9    Coronary artery disease I25.10    Seizure after head injury ([facility]) R56.1    Syncope and collapse R55       RECOMMENDATIONS:  Continue to monitor orthostatic blood pressures both from bed and standing and after walking and continue to closely monitor for cardiac arrhythmia to determine if he may require insertion of a permanent pacemaker.Adjust medications accordingly.    I have spent more than 26 minutes face to face with Phillip Fields and reviewing notes and laboratory data, with greater than 50% of this time instructing and counseling the patient 25 face to face regarding my findings and recommendations and I have answered all questions as posed to me by Mr. Fields.and his daughter who is present at his bedsideIn the hallway of 6000 extension.    Brent Hutchinson, DO FACP,FACC,[facility]      NOTE:  This report was transcribed using voice recognition software.  Every effort was made to ensure accuracy; however, inadvertent computerized transcription errors may be present

## 2024-08-28 NOTE — PROGRESS NOTES
Problem solving:  good    Judgement/safety:  good    Communication:  good    Insight:  good     Additional Comments:  Pt was pleasant and cooperative.      Vitals/Lab Values:  WFL Room air, HR ~ 65-70       Functional Assessment:  AM-PAC Daily Activity Raw Score: 18/24     Initial Eval Status  Date: 8-28-24   Treatment Status  Date: STGs = LTGs  Time frame: 10-14 days   Feeding IND      NA   Grooming SUP/set up    Light ax standing at sink    IND  Standing at the Sink   UB Dressing SUP/set up    Donning robe seated EOB    IND     LB Dressing SUP/set up    Donning socks, pants seated/standing EOB  Pt ed for safe/adaptive techs, use of adaptive equip    IND     Bathing NT    Pt ed re: Benefits of use of Shower chair for safety    IND      Toileting NT      IND     Bed Mobility  Supine to sit: SUP   Sit to supine:  SUP     VCs for safety    Supine to sit: IND  Sit to supine: IND     Functional Transfers SUP    EOB 2x, chair  Pt ed for safety/hand placement    IND     Functional Mobility SUP w/o AD    Household distances in room, extended distance in hallway - no symptoms occurred w/ ax  Pt ed for safety/improved safety awareness, walker safety    IND     Balance Sitting:     Static:  Remote SUP    Dynamic:  SUP w/ functional ax     Standing:     Static:  SUP w/o AD    Dynamic:  Close SUP w/ functional ax/mobility w/o AD    Sitting:     Static:  IND    Dynamic:  IND w/ functional ax    Standing:     Static:  IND w/ AD PRN    Dynamic:  IND w/ functional ax/mobility w/ AD PRN   Activity Tolerance Fair(+)    Good   Visual/  Perceptual    Hearing: WNL   Glasses: No    WFL   Hearing Aids:  No               Hand Dominance: Right   AROM Strength Additional Info:    RUE  WFL WNL  WNL ;    WNL FMC/dexterity noted during ADL tasks     LUE WFL WNL  WNL ;     WNL FMC/dexterity noted during ADL tasks       Sensation:  Denies numbness or tingling Rhys UEs   Tone: WFL Rhys UEs   Edema: None Noted Rhys UEs     Comments: Upon  safety    Made all appropriate Environmental Modifications to facilitate pt's level of IND and safety.    Recommendations for Continued Participation in OT services during Hospitalization and at D/C     Pt and/or Family verbalized/demonstrated a Good understanding of education provided.  Will Review PRN.         Rehab Potential: Good for established goals     Patient / Family Goal: Not stated at this time      Patient and/or family were instructed on functional diagnosis, prognosis/goals and OT plan of care. Demonstrated Good understanding.     Eval Complexity: Low    Time In: 0932  Time Out: 0959  Total Treatment Time: 10 minutes    Min Units   OT Eval Low 97165  X  10   OT Eval Medium 32802      OT Eval High 74227      OT Re-Eval 62537       Therapeutic Ex 43924       Therapeutic Activities 56712       ADL/Self Care 71777  10  1   Orthotic Management 04559       Manual [id number]     Neuro Re-Ed 84402       Non-Billable Time              Evaluation Time additionally includes thorough review of current medical information, gathering information on past medical history/social history and prior level of function, completion of standardized testing/informal observation of tasks, assessment of data and education on plan of care and goals.            Caitlyn Randle, MOT, OTR/L  # [id number]

## 2024-08-28 NOTE — PROGRESS NOTES
Pt complaining of lightheadedness, tingling in fingers bilaterally, & mild nausea. Upon looking at cardiac rhythm on monitor, pt had several PVCs. This RN collected 12 lead EKG & transmitted to chart. Before EKG was completed pt expressed that all symptoms had resolved. Electronically signed by Benny Boyce RN on 8/28/2024 at 10:40 AM

## 2024-08-28 NOTE — FLOWSHEET NOTE
Pt had 30 beats of Vtach     08/28/24 0001 08/28/24 0002 08/28/24 0004   Vitals   Temp 97.4 °F (36.3 °C)  --   --    Temp Source Temporal  --   --    Pulse 98 58 58   Heart Rate Source Telemetry  --   --    Respirations 22  --   --    BP (!) 179/68 (!) 167/65 (!) 160/65   MAP (Calculated) 105 99 97   BP Location Left upper arm Right upper arm Left lower arm   BP Upper/Lower Upper Upper Lower   BP Method Automatic Automatic Manual   Patient Position Semi fowlers Semi fowlers Semi fowlers

## 2024-08-29 ENCOUNTER — APPOINTMENT (OUTPATIENT)
Dept: ULTRASOUND IMAGING | Age: 84
DRG: 310 | End: 2024-08-29
Payer: MEDICARE

## 2024-08-29 VITALS
DIASTOLIC BLOOD PRESSURE: 57 MMHG | WEIGHT: 192.9 LBS | TEMPERATURE: 98 F | HEART RATE: 66 BPM | SYSTOLIC BLOOD PRESSURE: 127 MMHG | OXYGEN SATURATION: 96 % | HEIGHT: 72 IN | BODY MASS INDEX: 26.13 KG/M2 | RESPIRATION RATE: 18 BRPM

## 2024-08-29 LAB
ANION GAP SERPL CALCULATED.3IONS-SCNC: 12 MMOL/L (ref 7–16)
BASOPHILS # BLD: 0.07 K/UL (ref 0–0.2)
BASOPHILS NFR BLD: 1 % (ref 0–2)
BUN SERPL-MCNC: 13 MG/DL (ref 6–23)
CALCIUM SERPL-MCNC: 8.5 MG/DL (ref 8.6–10.2)
CHLORIDE SERPL-SCNC: 109 MMOL/L (ref 98–107)
CO2 SERPL-SCNC: 22 MMOL/L (ref 22–29)
CREAT SERPL-MCNC: 0.8 MG/DL (ref 0.7–1.2)
EOSINOPHIL # BLD: 0.44 K/UL (ref 0.05–0.5)
EOSINOPHILS RELATIVE PERCENT: 6 % (ref 0–6)
ERYTHROCYTE [DISTWIDTH] IN BLOOD BY AUTOMATED COUNT: 12.4 % (ref 11.5–15)
GFR, ESTIMATED: 87 ML/MIN/1.73M2
GLUCOSE SERPL-MCNC: 128 MG/DL (ref 74–99)
HCT VFR BLD AUTO: 39.5 % (ref 37–54)
HGB BLD-MCNC: 13.7 G/DL (ref 12.5–16.5)
IMM GRANULOCYTES # BLD AUTO: <0.03 K/UL (ref 0–0.58)
IMM GRANULOCYTES NFR BLD: 0 % (ref 0–5)
LYMPHOCYTES NFR BLD: 2.76 K/UL (ref 1.5–4)
LYMPHOCYTES RELATIVE PERCENT: 36 % (ref 20–42)
MAGNESIUM SERPL-MCNC: 2.1 MG/DL (ref 1.6–2.6)
MCH RBC QN AUTO: 32.1 PG (ref 26–35)
MCHC RBC AUTO-ENTMCNC: 34.7 G/DL (ref 32–34.5)
MCV RBC AUTO: 92.5 FL (ref 80–99.9)
MONOCYTES NFR BLD: 0.86 K/UL (ref 0.1–0.95)
MONOCYTES NFR BLD: 11 % (ref 2–12)
NEUTROPHILS NFR BLD: 47 % (ref 43–80)
NEUTS SEG NFR BLD: 3.63 K/UL (ref 1.8–7.3)
PLATELET, FLUORESCENCE: 139 K/UL (ref 130–450)
PMV BLD AUTO: 11.6 FL (ref 7–12)
POTASSIUM SERPL-SCNC: 4 MMOL/L (ref 3.5–5)
RBC # BLD AUTO: 4.27 M/UL (ref 3.8–5.8)
SODIUM SERPL-SCNC: 143 MMOL/L (ref 132–146)
WBC OTHER # BLD: 7.8 K/UL (ref 4.5–11.5)

## 2024-08-29 PROCEDURE — 80048 BASIC METABOLIC PNL TOTAL CA: CPT

## 2024-08-29 PROCEDURE — 83735 ASSAY OF MAGNESIUM: CPT

## 2024-08-29 PROCEDURE — 36415 COLL VENOUS BLD VENIPUNCTURE: CPT

## 2024-08-29 PROCEDURE — 99239 HOSP IP/OBS DSCHRG MGMT >30: CPT | Performed by: INTERNAL MEDICINE

## 2024-08-29 PROCEDURE — 6360000002 HC RX W HCPCS: Performed by: FAMILY MEDICINE

## 2024-08-29 PROCEDURE — 93880 EXTRACRANIAL BILAT STUDY: CPT

## 2024-08-29 PROCEDURE — 85025 COMPLETE CBC W/AUTO DIFF WBC: CPT

## 2024-08-29 PROCEDURE — 2580000003 HC RX 258: Performed by: FAMILY MEDICINE

## 2024-08-29 PROCEDURE — 6370000000 HC RX 637 (ALT 250 FOR IP): Performed by: FAMILY MEDICINE

## 2024-08-29 RX ADMIN — SODIUM CHLORIDE, PRESERVATIVE FREE 10 ML: 5 INJECTION INTRAVENOUS at 08:51

## 2024-08-29 RX ADMIN — CLOPIDOGREL BISULFATE 75 MG: 75 TABLET ORAL at 08:51

## 2024-08-29 RX ADMIN — ENOXAPARIN SODIUM 40 MG: 100 INJECTION SUBCUTANEOUS at 08:51

## 2024-08-29 RX ADMIN — AMLODIPINE BESYLATE 5 MG: 5 TABLET ORAL at 08:51

## 2024-08-29 NOTE — PROGRESS NOTES
PROGRESS NOTE       PATIENT PROBLEM LIST:  Patient Active Problem List   Diagnosis Code    Leukocytosis D72.829    PAD (peripheral artery disease) ([facility]) I73.9    HLD (hyperlipidemia) E78.5    Type 2 diabetes mellitus without complication, without long-term current use of insulin ([facility]) E11.9    Traumatic subarachnoid hemorrhage ([facility]) S06.6XAA    SDH (subdural hematoma) ([facility]) S06.5XAA    SAH (subarachnoid hemorrhage) ([facility]) I60.9    Coronary artery disease I25.10    Seizure after head injury ([facility]) R56.1    Syncope and collapse R55       SUBJECTIVE:  Phillip Fielsd states he feels better this evening and has been up walking without difficulty however when he came back to his room.  He sat down in his chair and soon became lightheaded and generally weak but not to the same extent as he was prior to admission.    REVIEW OF SYSTEMS:  General ROS: positive for - Transient fatigue.  Psychological ROS: negative for - anxiety , depression  Ophthalmic ROS: negative for - decreased vision or visual distortion.  ENT ROS: negative  Allergy and Immunology ROS: negative  Hematological and Lymphatic ROS: negative  Endocrine: no heat or cold intolerance and no polyphagia, polydipsia, or polyuria  Respiratory ROS: negative for - cough, hemoptysis, pleuritic pain, and wheezing  Cardiovascular ROS: positive for - dyspnea on exertion, irregular heartbeat, and loss of consciousness.  Gastrointestinal ROS: no abdominal pain, change in bowel habits, or black or bloody stools  Genito-Urinary ROS: no nocturia, dysuria, trouble voiding, frequency or hematuria  Musculoskeletal ROS: negative for- myalgias, arthralgias, or claudication  Neurological ROS: no TIA or stroke symptoms otherwise no significant change in symptoms or problems since yesterday as documented in previous progress notes.    SCHEDULED MEDICATIONS:   amLODIPine  5 mg Oral Daily    clopidogrel  75 mg Oral Daily    rosuvastatin  20 mg Oral Nightly    sodium chloride flush  5-40 mL  found for: \"PH\", \"PO2\", \"PCO2\"  INR: No results for input(s): \"INR\" in the last 72 hours.  PRO-BNP:   Lab Results   Component Value Date    PROBNP 266 08/25/2024      TSH:   Lab Results   Component Value Date    TSH 2.58 08/27/2024      Cardiac Injury Profile: No results for input(s): \"TROPHS\" in the last 72 hours.   Lipid Profile:   Lab Results   Component Value Date/Time    TRIG 99 05/02/2024 10:01 AM    HDL 45 05/02/2024 10:01 AM    CHOL 119 05/02/2024 10:01 AM      Hemoglobin A1C: No components found for: \"HGBA1C\"      RAD:   No results found.      EKG: See Report  Echo: See Report      IMPRESSIONS:  Patient Active Problem List   Diagnosis Code    Leukocytosis D72.829    PAD (peripheral artery disease) ([facility]) I73.9    HLD (hyperlipidemia) E78.5    Type 2 diabetes mellitus without complication, without long-term current use of insulin ([facility]) E11.9    Traumatic subarachnoid hemorrhage ([facility]) S06.6XAA    SDH (subdural hematoma) ([facility]) S06.5XAA    SAH (subarachnoid hemorrhage) ([facility]) I60.9    Coronary artery disease I25.10    Seizure after head injury ([facility]) R56.1    Syncope and collapse R55       RECOMMENDATIONS:  Maintain present medical regimen but may require lowering of his systolic blood pressure but would avoid beta-blockers presently and continue to carefully monitor orthostatics and heart rhythm.    I have spent more than 26 minutes face to face with Phillip Fields and reviewing notes and laboratory data, with greater than 50% of this time instructing and counseling the patient  face to face regarding my findings and recommendations and I have answered all questions as posed to me by Mr. Fields.    Brent Hutchinson, DO FACP,FACC,[facility]      NOTE:  This report was transcribed using voice recognition software.  Every effort was made to ensure accuracy; however, inadvertent computerized transcription errors may be present

## 2024-08-29 NOTE — CARE COORDINATION
CM Update: Discharge plan is Home with no home health care needs. Patient came to hospital with lightheadedness and nausea. Admitted for Syncope and Collapse. Had more V-Tach last night. CM/SW to follow. MT

## 2024-08-29 NOTE — PLAN OF CARE
0715 assumed care for the pt. Awake sitting up in the bed. Denies pain or discomfort. SBP slightly elevated. Repositioned self in the bed. States he is ready to go home. Placed call bell in reach. Will continue to monitor.

## 2024-08-29 NOTE — PROGRESS NOTES
[facility] Hospitalist Progress Note      Synopsis: Patient with a hx of PAD, HLD, DM2, SAH and SDH, CAD, seizure hx admitted on 8/26/2024 after presenting to the ED with dizziness. Recently discharged from [facility] ED with negative workup; in our ED pt was noted to be bradycardic, was therefore admitted for further evaluation and treatment of symptomatic bradycardia.  Echo was obtained and largely reassuring      Subjective  Bradycardia resolved  Hypertensive  Did well with PT and OT yesterday    Exam:  BP (!) 154/61   Pulse 57   Temp 98.1 °F (36.7 °C) (Axillary)   Resp 20   Ht 1.829 m (6')   Wt 87.5 kg (192 lb 14.4 oz)   SpO2 100%   BMI 26.16 kg/m²   General appearance: No apparent distress, appears stated age and cooperative.  HEENT: Pupils equal, round, and reactive to light. Conjunctivae/corneas clear.  Neck: Supple. No jugular venous distention. Trachea midline.  Respiratory:  Normal respiratory effort. Clear to auscultation, bilaterally without Rales/Wheezes/Rhonchi.  Cardiovascular: Regular rate and rhythm with normal S1/S2 without murmurs, rubs or gallops.  Abdomen: Soft, non-tender, non-distended with normal bowel sounds.  Musculoskeletal: No clubbing, cyanosis or edema bilaterally. Brisk capillary refill. 2+ lower extremity pulses (dorsalis pedis).   Skin:  No rashes    Neurologic: awake, alert and following commands     Medications:  Reviewed    Infusion Medications    sodium chloride       Scheduled Medications    amLODIPine  5 mg Oral Daily    clopidogrel  75 mg Oral Daily    rosuvastatin  20 mg Oral Nightly    sodium chloride flush  5-40 mL IntraVENous 2 times per day    enoxaparin  40 mg SubCUTAneous Daily     PRN Meds: perflutren lipid microspheres, sodium chloride flush, sodium chloride, potassium chloride **OR** potassium alternative oral replacement **OR** potassium chloride, ondansetron, polyethylene glycol, acetaminophen **OR** acetaminophen, aluminum & magnesium hydroxide-simethicone,  prochlorperazine    I/O    Intake/Output Summary (Last 24 hours) at 8/29/2024 1023  Last data filed at 8/28/2024 1841  Gross per 24 hour   Intake 2782.42 ml   Output --   Net 2782.42 ml       Labs:   Recent Labs     08/26/24  1901 08/27/24  1051 08/28/24  0442 08/29/24  0414   WBC 7.7 6.7 8.5 7.8   HGB 13.8 14.7 14.6 13.7   HCT 41.3 43.0 43.0 39.5     --  141  --        Recent Labs     08/27/24  1051 08/28/24  0442 08/29/24  0414    140 143   K 4.1 4.3 4.0    106 109*   CO2 23 23 22   BUN 13 14 13   CREATININE 0.9 0.8 0.8   CALCIUM 8.8 9.0 8.5*       No results for input(s): \"ALKPHOS\", \"ALT\", \"AST\", \"BILITOT\", \"AMYLASE\", \"LIPASE\" in the last 72 hours.    Invalid input(s): \"PROT\", \"ALB\"      No results for input(s): \"INR\" in the last 72 hours.      No results for input(s): \"CKTOTAL\", \"TROPONINI\" in the last 72 hours.    Chronic labs:  Lab Results   Component Value Date    CHOL 119 05/02/2024    TRIG 99 05/02/2024    HDL 45 05/02/2024    TSH 2.58 08/27/2024    PSA 2.47 11/08/2023    INR 1.0 08/25/2024    LABA1C 7.0 (H) 05/02/2024       Radiology:  Imaging studies reviewed today.    ASSESSMENT:  Symptomatic bradycardia  Hypertension  CAD status post CABG  Type 2 diabetes    PLAN:  Orthostasis resolved, bradycardia resolved  Strict intake and output, monitor renal function  Cardiology consulted, await final recommendations  Echo largely reassuring  Consider MRI brain if cardiac workup reassuring and symptoms persist  Continue aspirin Plavix  Glycemic control  PT and OT consulted      Diet: ADULT DIET; Regular  Code Status: Full Code  PT/OT Eval Status:   Ordered  DVT Prophylaxis:   Lovenox  Recommended disposition at discharge: Likely home at time of discharge, may benefit from home health care    +++++++++++++++++++++++++++++++++++++++++++++++++  Clarita Fierro MD   [facility] Hospitalist  [facility] [place].  +++++++++++++++++++++++++++++++++++++++++++++++++  NOTE: This report was transcribed

## 2024-08-29 NOTE — PLAN OF CARE
Problem: Discharge Planning  Goal: Discharge to home or other facility with appropriate resources  8/28/2024 2314 by Jyotsna Wong, RN  Outcome: Progressing  8/28/2024 1041 by Meera Springer RN  Outcome: Progressing  Flowsheets (Taken 8/28/2024 0856 by Benny Boyce, TREVOR)  Discharge to home or other facility with appropriate resources: Identify barriers to discharge with patient and caregiver     Problem: Safety - Adult  Goal: Free from fall injury  8/28/2024 2314 by Jyotsna Wong, RN  Outcome: Progressing  8/28/2024 1041 by Meera Springer RN  Outcome: Progressing     Problem: Chronic Conditions and Co-morbidities  Goal: Patient's chronic conditions and co-morbidity symptoms are monitored and maintained or improved  Outcome: Progressing

## 2024-08-29 NOTE — PROGRESS NOTES
IntraVENous 2 times per day    enoxaparin  40 mg SubCUTAneous Daily       VITAL SIGNS:                                                                                                                          BP (!) 167/67   Pulse 67   Temp 98 °F (36.7 °C) (Oral)   Resp 20   Ht 1.829 m (6')   Wt 87.5 kg (192 lb 14.4 oz)   SpO2 100%   BMI 26.16 kg/m²   Patient Vitals for the past 96 hrs (Last 3 readings):   Weight   08/29/24 0600 87.5 kg (192 lb 14.4 oz)   08/27/24 0756 81.6 kg (180 lb)     OBJECTIVE:    HEENT: PERRL, EOM  Intact; sclera non-icteric, conjunctiva pink. Carotids are brisk in upstroke with normal contour. No carotid bruits. Normal jugular venous pulsation at 45°. No palpable cervical nor supraclavicular nodes.Thyroid not palpable. Trachea midline.  Chest: Even excursion  Lungs: Grossly clear to auscultation bilaterally no expiratory wheezes or rhonchi, no decreased tactile fremitus without inspiratory rales.  Heart: Regular  rhythm; S1 > S2, no gallop Grade 2/6 systolic murmur LMSBNo clicks, rub, palpable thrills   or heaves. PMI nondisplaced, 5th intercostal space MCL.   Abdomen: Soft, nontender, nondistended,  mildly protuberant, no masses or organomegaly.  Bowel sounds active.  Extremities: Without clubbing, cyanosis or edema. Pulses present 3+ upper extermities bilaterally; present 1+ DP  bilaterally and present 1+ PT bilaterally.     Data:   Scheduled Meds: Reviewed  Continuous Infusions:    sodium chloride         Intake/Output Summary (Last 24 hours) at 8/29/2024 1204  Last data filed at 8/28/2024 1841  Gross per 24 hour   Intake 2542.42 ml   Output --   Net 2542.42 ml     CBC:   Recent Labs     08/26/24  1901 08/27/24  1051 08/28/24  0442 08/29/24  0414   WBC 7.7 6.7 8.5 7.8   HGB 13.8 14.7 14.6 13.7   HCT 41.3 43.0 43.0 39.5     --  141  --      BMP:  Recent Labs     08/26/24  1901 08/27/24  1051 08/28/24  0442 08/29/24  0414    139 140 143   K 4.4 4.1 4.3 4.0    103  errors may be present

## 2024-08-30 LAB
EKG ATRIAL RATE: 68 BPM
EKG P AXIS: 34 DEGREES
EKG P-R INTERVAL: 170 MS
EKG Q-T INTERVAL: 422 MS
EKG QRS DURATION: 102 MS
EKG QTC CALCULATION (BAZETT): 448 MS
EKG R AXIS: 48 DEGREES
EKG T AXIS: -8 DEGREES
EKG VENTRICULAR RATE: 68 BPM

## 2025-06-02 ENCOUNTER — HOSPITAL ENCOUNTER (OUTPATIENT)
Dept: VASCULAR MEDICINE | Facility: CLINIC | Age: 85
Discharge: HOME | End: 2025-06-02
Payer: MEDICARE

## 2025-06-02 DIAGNOSIS — I73.9 PERIPHERAL ARTERIAL DISEASE: ICD-10-CM

## 2025-06-02 PROCEDURE — 93924 LWR XTR VASC STDY BILAT: CPT

## 2025-06-02 PROCEDURE — 93924 LWR XTR VASC STDY BILAT: CPT | Performed by: INTERNAL MEDICINE

## 2025-06-02 NOTE — PROGRESS NOTES
Yearly follow up for claudication  Virtual or Telephone Consent    Virtual or Telephone Consent    An interactive audio and video telecommunication system which permits real time communications between the patient (at the originating site) and provider (at the distant site) was utilized to provide this telehealth service.   Verbal consent was requested and obtained from Odilon Khan on this date, 06/03/25 for a telehealth visit and the patient's location was confirmed at the time of the visit.        History of Present Illness  Odilon Khan is a 84 y.o. year old male with PMH of aortic aneurysm (2.7 cm- 2019, recommended repeat in 5 years), CAD, s/p PCI to left main into left circ and SVG to distal RCA on 2/6/2018, CABG with both RITO and one SVG graft in 1996, moderate AS and PAD with claudication, no previous intervention. Pt was last seen in 9/20.  His PVR with exercise done on 9/24/20 showed significant disease: Rt 0.75->0.31 post exercise, Lt 0.64->0.27 post, no interventions were warranted at that time as the symptoms were mild.   The echo showed mod-severe AS with Peak gradient 57 and Mean gradient 33.  Per scanned CTA of the AAA from Nov 2019, the infrarenal AAA was measuring at 32 mm.    Today, Mr. Khan is doing very well with minimal symptoms. He has some claudication whenever he is doing his daily walking exercise, but otherwise experiences no rest pain. He reports claudications symptoms remains unchanged since last visit, he continues to be very active with his yard work. He has not had to alter his lifestyle because of claudications. Has not had any wounds on his feet. He reports his feet to be warm, denies discoloration, mild ankle swelling. He is complaint with Plavix.    Past Medical History  Past Medical History:   Diagnosis Date    Atherosclerotic heart disease of native coronary artery without angina pectoris 09/13/2018    Coronary artery stenosis    Other ventricular tachycardia (Multi)  01/25/2018    Ventricular tachycardia, nonsustained    Peripheral vascular disease, unspecified 03/08/2018    Claudication    Personal history of other endocrine, nutritional and metabolic disease 01/25/2018    History of hyperlipidemia       Past Surgical History  No past surgical history on file.    Social History  Social History     Tobacco Use    Smoking status: Former     Types: Cigarettes    Smokeless tobacco: Never   Substance Use Topics    Alcohol use: Not Currently    Drug use: Not on file       Family History   No family history on file.    Review of Systems  As per HPI, all other systems reviewed and negative.    Allergies:  No Known Allergies     Outpatient Medications:  Current Outpatient Medications   Medication Instructions    amLODIPine (NORVASC) 5 mg, oral, Daily    ascorbic acid (VITAMIN C) 500 mg, oral, Daily    azelastine (Astelin) 137 mcg (0.1 %) nasal spray 2 sprays, Each Nostril, 2 times daily PRN    cholecalciferol (VITAMIN D-3) 5,000 Units, oral, Daily    clopidogrel (Plavix) 75 mg tablet 1 tablet, oral, Daily    fluticasone (Flonase) 50 mcg/actuation nasal spray 2 sprays, Each Nostril, Daily PRN    loratadine (CLARITIN) 10 mg, oral, Daily    metoprolol succinate XL (TOPROL-XL) 25 mg, oral, Daily    nitroglycerin (NITROSTAT) 0.6 mg, sublingual, Every 5 min PRN    OneTouch Delica Plus Lancet 33 gauge [facility]     OneTouch Ultra Test strip     rosuvastatin (CRESTOR) 20 mg, oral, Daily    vitamin E 100 Units, oral, Daily         Vitals:  There were no vitals filed for this visit.    Physical Exam:  No acute distress v      Reviewed Study(s):  Vascular US Ankle Brachial Index (AVILA) With Exercise 6/2/25  CONCLUSIONS:  Right Lower PVR: Evidence of moderate arterial occlusive disease in the right lower extremity at rest. Decreased digital perfusion noted. Monophasic flow is noted in the right common femoral artery, right posterior tibial artery and right dorsalis pedis artery. Right side was not  further compromised with exercise.  Left Lower PVR: Evidence of moderate arterial occlusive disease in the left lower extremity at rest. Decreased digital perfusion noted. Monophasic flow is noted in the left common femoral artery, left posterior tibial artery and left dorsalis pedis artery. Left side was not further compromised with exercise.  Exercise:Exercise completed at 0% grade and 1.2 miles per hour for 5 minutes. Patient ambulated for the full 5 minutes with no complaints.     Comparison:  Compared with study from 5/7/2024, no significant change.     Imaging & Doppler Findings:     RIGHT Lower PVR                Pressures Ratios  Right Posterior Tibial (Ankle) 91 mmHg   0.61  Right Dorsalis Pedis (Ankle)   99 mmHg   0.67  Right Digit (Great Toe)        61 mmHg   0.41     Right Ankle Post Exercise      93 mmHg   0.62  5 Minute Pressure              84 mmHg   0.62        LEFT Lower PVR                Pressures Ratios  Left Posterior Tibial (Ankle) 68 mmHg   0.46  Left Dorsalis Pedis (Ankle)   68 mmHg   0.46  Left Digit (Great Toe)        41 mmHg   0.28     Left Ankle Post Exercise      72 mmHg   0.48  5 Minute Pressure             77 mmHg   0.57         Vascular Studies  AVILA/PVR with Exercise (May 7, 2024):  Right Lower PVR: There is evidence of moderate disease at the femoral popliteal level. Hemodynamics are further compromised in the right lower extremity with exercise. Decreased digital perfusion noted. Biphasic flow is noted in the right posterior tibial artery and right dorsalis pedis artery. Triphasic flow is noted in the right common femoral artery and right popliteal artery.  Left Lower PVR: There is evidence of moderate disease at the femoral popliteal level. Hemodynamics are further compromised in the left lower extremity with exercise. Decreased digital perfusion noted. Biphasic flow is noted in the left dorsalis pedis artery and left posterior tibial artery. Triphasic flow is noted in the left  common femoral artery and left popliteal artery.  Exercise:Exercise completed at 0% grade and 2 1/2 miles per hour for 5 minutes.     Imaging & Doppler Findings:     RIGHT Lower PVR                Pressures Ratios  Right High Thigh               154 mmHg  1.03  Right Low Thigh                111 mmHg  0.74  Right Calf                     106 mmHg  0.71  Right Posterior Tibial (Ankle) 94 mmHg   0.63  Right Dorsalis Pedis (Ankle)   91 mmHg   0.61  Right Digit (Great Toe)        75 mmHg   0.50     Right Ankle Post Exercise      73 mmHg   0.46        LEFT Lower PVR                Pressures Ratios  Left High Thigh               178 mmHg  1.19  Left Low Thigh                120 mmHg  0.81  Left Calf                     104 mmHg  0.70  Left Posterior Tibial (Ankle) 78 mmHg   0.52  Left Dorsalis Pedis (Ankle)   79 mmHg   0.53  Left Digit (Great Toe)        45 mmHg   0.30     Left Ankle Post Exercise      65 mmHg   0.41                           Right     Left  Brachial Pressure 149 mmHg 142 mmHg    Assessment/Plan   Mr. Khan is a pleasant 84-year-old gentleman who presents to clinic for routine follow-up of his peripheral arterial disease with mild claudication symptoms with day-to-day activities. Reports that symptoms come only with fast paced walking, otherwise able to walk long distance with mild symptoms  Reviewed the vascular testing results, AVILA Rt 0.67/0.41 post EX 0.62; Lt 0.46/0.28 post EX 0.57. No significant difference compared to previous testing, patient symptoms remained same    Plan:   Continue to take Plavix 75 mg daily.  Continue with daily exercise therapy.  Discussed signs and symptoms of progression of PAD, including worsened claudication, rest pain, or development of wounds, with the patient and his family.  Please keep legs elevated above the heart level, to decrease swelling and promote  wound healing.   Follow-up in one year with repeat PVR with exercise  5.  US Aorta Duplex for AAA surveillance    6.  1 year follow up -recommended in person since this visit was virtual    Dwayne Handy DO

## 2025-06-03 ENCOUNTER — APPOINTMENT (OUTPATIENT)
Dept: CARDIOLOGY | Facility: CLINIC | Age: 85
End: 2025-06-03
Payer: MEDICARE

## 2025-06-03 DIAGNOSIS — I73.9 CLAUDICATION: ICD-10-CM

## 2025-06-03 DIAGNOSIS — I73.9 PAD (PERIPHERAL ARTERY DISEASE): Primary | ICD-10-CM

## 2025-06-03 PROCEDURE — 1159F MED LIST DOCD IN RCRD: CPT | Performed by: INTERNAL MEDICINE

## 2025-06-03 PROCEDURE — 99215 OFFICE O/P EST HI 40 MIN: CPT | Performed by: INTERNAL MEDICINE

## 2025-06-03 RX ORDER — LATANOPROST 50 UG/ML
SOLUTION/ DROPS OPHTHALMIC
COMMUNITY
Start: 2025-01-18

## (undated) DEVICE — Device

## (undated) DEVICE — TUBING, SUCTION, 3/16" X 12', STRAIGHT: Brand: MEDLINE

## (undated) DEVICE — GAUZE,SPONGE,4"X4",16PLY,XRAY,STRL,LF: Brand: MEDLINE

## (undated) DEVICE — PERFORATOR CRAN AD PED 14/11MM DGR O ROUNDED CUT EDGE DISP

## (undated) DEVICE — GLOVE SURG SZ 65 THK91MIL LTX FREE SYN POLYISOPRENE

## (undated) DEVICE — DRAINAGE ACCESSORY KIT: 500 ML DRAINAGE BAG FOR INTRAOPERATIVE DRAINAGE OF CEREBROSPINAL FLUID.: Brand: DRAINAGE ACCESSORY KIT

## (undated) DEVICE — HERMETIC™ LARGE-STYLE VENTRICULAR CATHETER SET: Brand: HERMETIC™

## (undated) DEVICE — SYRINGE,TOOMEY,IRRIGATION,70CC,STERILE: Brand: MEDLINE

## (undated) DEVICE — SOLUTION IV IRRIG POUR BRL 0.9% SODIUM CHL 2F7124

## (undated) DEVICE — INTENDED FOR TISSUE SEPARATION, AND OTHER PROCEDURES THAT REQUIRE A SHARP SURGICAL BLADE TO PUNCTURE OR CUT.: Brand: BARD-PARKER ® STAINLESS STEEL BLADES

## (undated) DEVICE — NEEDLE HYPO 21GA L1.5IN GRN POLYPR HUB S STL REG BVL STR

## (undated) DEVICE — SOLUTION IV IRRIG WATER 1000ML POUR BRL 2F7114

## (undated) DEVICE — CLOTH SURG PREP PREOPERATIVE CHLORHEXIDINE GLUC 2% READYPREP

## (undated) DEVICE — SYRINGE,EAR/ULCER, 3 OZ, STERILE: Brand: MEDLINE

## (undated) DEVICE — GOWN,SIRUS,FABRNF,XL,20/CS: Brand: MEDLINE

## (undated) DEVICE — CATHETER,URETHRAL,REDRUBBER,STRL,10FR: Brand: MEDLINE INDUSTRIES, INC.

## (undated) DEVICE — READY WET SKIN SCRUB TRAY-LF: Brand: MEDLINE INDUSTRIES, INC.

## (undated) DEVICE — 3M™ IOBAN™ 2 ANTIMICROBIAL INCISE DRAPE 6640EZ: Brand: IOBAN™ 2

## (undated) DEVICE — GLOVE SURG SZ 85 STD WHT LTX SYN POLYMER BEAD REINF ANTI RL

## (undated) DEVICE — 3M™ MEDIPORE™ + PAD 3564: Brand: 3M™ MEDIPORE™

## (undated) DEVICE — TOWEL,OR,DSP,ST,BLUE,DLX,10/PK,8PK/CS: Brand: MEDLINE

## (undated) DEVICE — GLOVE ORANGE PI 8   MSG9080

## (undated) DEVICE — LABEL MED 4 IN SURG PANEL W/ PEN STRL

## (undated) DEVICE — APPLICATOR PREP 26ML 0.7% IOD POVACRYLEX 74% ISO ALC ST

## (undated) DEVICE — SURGICAL PROCEDURE PACK CRANIOTOMY CUST

## (undated) DEVICE — SYRINGE 20ML LL S/C 50

## (undated) DEVICE — Z DUP USE 2257490 ADHESIVE SKIN CLSRE 036ML TPCL 2CTL CNCRLTE HIGH VSCSTY DRMB

## (undated) DEVICE — 1.5L THIN WALL CAN: Brand: CRD

## (undated) DEVICE — DOUBLE BASIN SET: Brand: MEDLINE INDUSTRIES, INC.

## (undated) DEVICE — BLADE CLP TAPR HD WET DRY CAPABILITY GTT IN CHARGING USE